# Patient Record
Sex: FEMALE | Race: BLACK OR AFRICAN AMERICAN | NOT HISPANIC OR LATINO | Employment: FULL TIME | URBAN - METROPOLITAN AREA
[De-identification: names, ages, dates, MRNs, and addresses within clinical notes are randomized per-mention and may not be internally consistent; named-entity substitution may affect disease eponyms.]

---

## 2023-03-08 DIAGNOSIS — E78.5 HYPERLIPIDEMIA, UNSPECIFIED HYPERLIPIDEMIA TYPE: Primary | ICD-10-CM

## 2023-03-09 RX ORDER — ROSUVASTATIN CALCIUM 20 MG/1
TABLET, COATED ORAL
Qty: 90 TABLET | Refills: 1 | Status: SHIPPED | OUTPATIENT
Start: 2023-03-09 | End: 2023-07-18 | Stop reason: ALTCHOICE

## 2023-07-18 ENCOUNTER — OFFICE VISIT (OUTPATIENT)
Dept: PRIMARY CARE | Facility: CLINIC | Age: 63
End: 2023-07-18
Payer: COMMERCIAL

## 2023-07-18 ENCOUNTER — LAB (OUTPATIENT)
Dept: LAB | Facility: LAB | Age: 63
End: 2023-07-18
Payer: COMMERCIAL

## 2023-07-18 VITALS
OXYGEN SATURATION: 98 % | HEIGHT: 66 IN | WEIGHT: 230.3 LBS | HEART RATE: 72 BPM | SYSTOLIC BLOOD PRESSURE: 112 MMHG | RESPIRATION RATE: 16 BRPM | TEMPERATURE: 98 F | DIASTOLIC BLOOD PRESSURE: 62 MMHG | BODY MASS INDEX: 37.01 KG/M2

## 2023-07-18 DIAGNOSIS — I10 ESSENTIAL (PRIMARY) HYPERTENSION: ICD-10-CM

## 2023-07-18 DIAGNOSIS — Z12.31 ENCOUNTER FOR SCREENING MAMMOGRAM FOR BREAST CANCER: ICD-10-CM

## 2023-07-18 DIAGNOSIS — R53.83 FATIGUE, UNSPECIFIED TYPE: ICD-10-CM

## 2023-07-18 DIAGNOSIS — E78.5 HYPERLIPIDEMIA, UNSPECIFIED HYPERLIPIDEMIA TYPE: ICD-10-CM

## 2023-07-18 DIAGNOSIS — E55.9 VITAMIN D DEFICIENCY: ICD-10-CM

## 2023-07-18 DIAGNOSIS — I10 ESSENTIAL HYPERTENSION: ICD-10-CM

## 2023-07-18 DIAGNOSIS — E66.9 DIABETES MELLITUS TYPE 2 IN OBESE: Primary | ICD-10-CM

## 2023-07-18 DIAGNOSIS — E11.69 DIABETES MELLITUS TYPE 2 IN OBESE: Primary | ICD-10-CM

## 2023-07-18 DIAGNOSIS — E11.9 TYPE 2 DIABETES MELLITUS WITHOUT COMPLICATION, UNSPECIFIED WHETHER LONG TERM INSULIN USE (MULTI): ICD-10-CM

## 2023-07-18 DIAGNOSIS — Z12.11 ENCOUNTER FOR SCREENING FOR MALIGNANT NEOPLASM OF COLON: ICD-10-CM

## 2023-07-18 DIAGNOSIS — Z78.0 ASYMPTOMATIC MENOPAUSE: ICD-10-CM

## 2023-07-18 PROBLEM — Z96.651 CHRONIC KNEE PAIN AFTER TOTAL REPLACEMENT OF RIGHT KNEE JOINT: Status: ACTIVE | Noted: 2023-07-18

## 2023-07-18 PROBLEM — M79.671 RIGHT FOOT PAIN: Status: ACTIVE | Noted: 2023-07-18

## 2023-07-18 PROBLEM — M25.812 SHOULDER IMPINGEMENT, LEFT: Status: ACTIVE | Noted: 2023-07-18

## 2023-07-18 PROBLEM — M25.571 SINUS TARSI SYNDROME OF RIGHT FOOT: Status: ACTIVE | Noted: 2023-07-18

## 2023-07-18 PROBLEM — F17.200 TOBACCO USE DISORDER: Status: ACTIVE | Noted: 2023-07-18

## 2023-07-18 PROBLEM — R05.8 COUGH WITH EXPOSURE TO COVID-19 VIRUS: Status: ACTIVE | Noted: 2023-07-18

## 2023-07-18 PROBLEM — M25.511 RIGHT SHOULDER PAIN: Status: ACTIVE | Noted: 2023-07-18

## 2023-07-18 PROBLEM — N95.2 POSTMENOPAUSAL ATROPHIC VAGINITIS: Status: ACTIVE | Noted: 2023-07-18

## 2023-07-18 PROBLEM — M25.561 RIGHT KNEE PAIN: Status: ACTIVE | Noted: 2023-07-18

## 2023-07-18 PROBLEM — M25.561 CHRONIC KNEE PAIN AFTER TOTAL REPLACEMENT OF RIGHT KNEE JOINT: Status: ACTIVE | Noted: 2023-07-18

## 2023-07-18 PROBLEM — M25.562 LEFT KNEE PAIN: Status: ACTIVE | Noted: 2023-07-18

## 2023-07-18 PROBLEM — M19.041 ARTHRITIS OF HAND, RIGHT, DEGENERATIVE: Status: ACTIVE | Noted: 2023-07-18

## 2023-07-18 PROBLEM — M89.9 SCAPULAR DYSFUNCTION: Status: ACTIVE | Noted: 2023-07-18

## 2023-07-18 PROBLEM — G57.91 NEURITIS OF RIGHT FOOT: Status: ACTIVE | Noted: 2023-07-18

## 2023-07-18 PROBLEM — M25.811 SHOULDER IMPINGEMENT, RIGHT: Status: ACTIVE | Noted: 2023-07-18

## 2023-07-18 PROBLEM — M54.50 LOW BACK PAIN: Status: ACTIVE | Noted: 2023-07-18

## 2023-07-18 PROBLEM — F17.200 TOBACCO USE DISORDER: Status: RESOLVED | Noted: 2023-07-18 | Resolved: 2023-07-18

## 2023-07-18 PROBLEM — I70.0 ATHEROSCLEROSIS OF AORTA (CMS-HCC): Status: ACTIVE | Noted: 2023-07-18

## 2023-07-18 PROBLEM — G56.22 CUBITAL TUNNEL SYNDROME ON LEFT: Status: ACTIVE | Noted: 2023-07-18

## 2023-07-18 PROBLEM — R10.9 FLANK PAIN: Status: ACTIVE | Noted: 2023-07-18

## 2023-07-18 PROBLEM — G89.29 CHRONIC KNEE PAIN AFTER TOTAL REPLACEMENT OF RIGHT KNEE JOINT: Status: ACTIVE | Noted: 2023-07-18

## 2023-07-18 PROBLEM — M17.12 OSTEOARTHRITIS OF LEFT KNEE: Status: ACTIVE | Noted: 2023-07-18

## 2023-07-18 PROBLEM — Z86.59 HISTORY OF CLAUSTROPHOBIA: Status: ACTIVE | Noted: 2023-07-18

## 2023-07-18 PROBLEM — E04.9 GOITER: Status: ACTIVE | Noted: 2023-07-18

## 2023-07-18 PROBLEM — M19.071 ARTHRITIS OF FOOT, RIGHT: Status: ACTIVE | Noted: 2023-07-18

## 2023-07-18 PROBLEM — E87.6 HYPOKALEMIA: Status: ACTIVE | Noted: 2023-07-18

## 2023-07-18 PROBLEM — M70.62 TROCHANTERIC BURSITIS OF LEFT HIP: Status: ACTIVE | Noted: 2023-07-18

## 2023-07-18 PROBLEM — Z96.659 HISTORY OF TOTAL KNEE REPLACEMENT: Status: ACTIVE | Noted: 2023-07-18

## 2023-07-18 PROBLEM — M77.31 CALCANEAL SPUR OF RIGHT FOOT: Status: ACTIVE | Noted: 2023-07-18

## 2023-07-18 PROBLEM — Z20.822 COUGH WITH EXPOSURE TO COVID-19 VIRUS: Status: ACTIVE | Noted: 2023-07-18

## 2023-07-18 PROBLEM — M54.2 CERVICAL PAIN: Status: ACTIVE | Noted: 2023-07-18

## 2023-07-18 PROBLEM — E05.90 HYPERTHYROIDISM, SUBCLINICAL: Status: ACTIVE | Noted: 2023-07-18

## 2023-07-18 PROBLEM — G47.33 OSA (OBSTRUCTIVE SLEEP APNEA): Status: ACTIVE | Noted: 2023-07-18

## 2023-07-18 PROBLEM — M54.12 CERVICAL RADICULOPATHY: Status: ACTIVE | Noted: 2023-07-18

## 2023-07-18 PROBLEM — M79.641 PAIN OF RIGHT HAND: Status: ACTIVE | Noted: 2023-07-18

## 2023-07-18 PROBLEM — J30.9 ALLERGIC RHINITIS: Status: ACTIVE | Noted: 2023-07-18

## 2023-07-18 PROBLEM — M72.2 PLANTAR FASCIITIS OF LEFT FOOT: Status: ACTIVE | Noted: 2023-07-18

## 2023-07-18 PROBLEM — R07.89 ATYPICAL CHEST PAIN: Status: ACTIVE | Noted: 2023-07-18

## 2023-07-18 PROBLEM — M24.662 ARTHROFIBROSIS OF KNEE JOINT, LEFT: Status: ACTIVE | Noted: 2023-07-18

## 2023-07-18 PROBLEM — M19.011 ARTHRITIS OF RIGHT SHOULDER REGION: Status: ACTIVE | Noted: 2023-07-18

## 2023-07-18 PROBLEM — R09.81 CONGESTED NOSE: Status: ACTIVE | Noted: 2023-07-18

## 2023-07-18 PROBLEM — S46.812A STRAIN OF LEFT TRAPEZIUS MUSCLE: Status: ACTIVE | Noted: 2023-07-18

## 2023-07-18 PROBLEM — G25.89 SCAPULAR DYSKINESIS: Status: ACTIVE | Noted: 2023-07-18

## 2023-07-18 LAB
CALCIDIOL (25 OH VITAMIN D3) (NG/ML) IN SER/PLAS: 56 NG/ML
CHOLESTEROL (MG/DL) IN SER/PLAS: 102 MG/DL (ref 0–199)
CHOLESTEROL IN HDL (MG/DL) IN SER/PLAS: 50.2 MG/DL
CHOLESTEROL/HDL RATIO: 2
COBALAMIN (VITAMIN B12) (PG/ML) IN SER/PLAS: 588 PG/ML (ref 211–911)
ESTIMATED AVERAGE GLUCOSE FOR HBA1C: 140 MG/DL
HEMOGLOBIN A1C/HEMOGLOBIN TOTAL IN BLOOD: 6.5 %
IRON (UG/DL) IN SER/PLAS: 88 UG/DL (ref 35–150)
IRON BINDING CAPACITY (UG/DL) IN SER/PLAS: 410 UG/DL (ref 240–445)
IRON SATURATION (%) IN SER/PLAS: 21 % (ref 25–45)
LDL: 39 MG/DL (ref 0–99)
POTASSIUM (MMOL/L) IN SER/PLAS: 3.1 MMOL/L (ref 3.5–5.3)
THYROTROPIN (MIU/L) IN SER/PLAS BY DETECTION LIMIT <= 0.05 MIU/L: 0.4 MIU/L (ref 0.44–3.98)
THYROXINE (T4) FREE (NG/DL) IN SER/PLAS: 0.93 NG/DL (ref 0.78–1.48)
TRIGLYCERIDE (MG/DL) IN SER/PLAS: 63 MG/DL (ref 0–149)
VLDL: 13 MG/DL (ref 0–40)

## 2023-07-18 PROCEDURE — 99213 OFFICE O/P EST LOW 20 MIN: CPT | Performed by: FAMILY MEDICINE

## 2023-07-18 PROCEDURE — 83550 IRON BINDING TEST: CPT

## 2023-07-18 PROCEDURE — 82607 VITAMIN B-12: CPT

## 2023-07-18 PROCEDURE — 36415 COLL VENOUS BLD VENIPUNCTURE: CPT

## 2023-07-18 PROCEDURE — 84443 ASSAY THYROID STIM HORMONE: CPT

## 2023-07-18 PROCEDURE — 82306 VITAMIN D 25 HYDROXY: CPT

## 2023-07-18 PROCEDURE — 1036F TOBACCO NON-USER: CPT | Performed by: FAMILY MEDICINE

## 2023-07-18 PROCEDURE — 3078F DIAST BP <80 MM HG: CPT | Performed by: FAMILY MEDICINE

## 2023-07-18 PROCEDURE — 80061 LIPID PANEL: CPT

## 2023-07-18 PROCEDURE — 84132 ASSAY OF SERUM POTASSIUM: CPT

## 2023-07-18 PROCEDURE — 84439 ASSAY OF FREE THYROXINE: CPT

## 2023-07-18 PROCEDURE — 4010F ACE/ARB THERAPY RXD/TAKEN: CPT | Performed by: FAMILY MEDICINE

## 2023-07-18 PROCEDURE — 3074F SYST BP LT 130 MM HG: CPT | Performed by: FAMILY MEDICINE

## 2023-07-18 PROCEDURE — 83036 HEMOGLOBIN GLYCOSYLATED A1C: CPT

## 2023-07-18 PROCEDURE — 83540 ASSAY OF IRON: CPT

## 2023-07-18 RX ORDER — DULAGLUTIDE 4.5 MG/.5ML
INJECTION, SOLUTION SUBCUTANEOUS
Qty: 2 ML | Refills: 3 | Status: SHIPPED | OUTPATIENT
Start: 2023-07-18 | End: 2023-11-02

## 2023-07-18 RX ORDER — METFORMIN HYDROCHLORIDE 500 MG/1
TABLET ORAL
COMMUNITY
End: 2024-03-02

## 2023-07-18 RX ORDER — BLOOD SUGAR DIAGNOSTIC
STRIP MISCELLANEOUS
COMMUNITY
Start: 2018-10-03

## 2023-07-18 RX ORDER — LISINOPRIL 2.5 MG/1
1 TABLET ORAL EVERY EVENING
COMMUNITY
Start: 2015-12-15

## 2023-07-18 RX ORDER — CHOLECALCIFEROL (VITAMIN D3) 50 MCG
1 TABLET ORAL DAILY
COMMUNITY

## 2023-07-18 RX ORDER — PANTOPRAZOLE SODIUM 20 MG/1
20 TABLET, DELAYED RELEASE ORAL
COMMUNITY
Start: 2012-04-13 | End: 2024-03-19 | Stop reason: WASHOUT

## 2023-07-18 RX ORDER — DULAGLUTIDE 4.5 MG/.5ML
INJECTION, SOLUTION SUBCUTANEOUS
COMMUNITY
Start: 2023-05-16 | End: 2023-07-18 | Stop reason: SDUPTHER

## 2023-07-18 RX ORDER — LANCETS
EACH MISCELLANEOUS
COMMUNITY
Start: 2018-10-03

## 2023-07-18 RX ORDER — FLUTICASONE PROPIONATE 50 MCG
2 SPRAY, SUSPENSION (ML) NASAL DAILY
COMMUNITY
Start: 2022-12-05 | End: 2023-10-23

## 2023-07-18 RX ORDER — ACETAMINOPHEN 325 MG/1
TABLET ORAL EVERY 4 HOURS PRN
COMMUNITY
Start: 2012-04-12

## 2023-07-18 RX ORDER — GABAPENTIN 100 MG/1
100 CAPSULE ORAL 3 TIMES DAILY
COMMUNITY
Start: 2012-04-12 | End: 2024-03-19 | Stop reason: WASHOUT

## 2023-07-18 RX ORDER — CHLORTHALIDONE 25 MG/1
25 TABLET ORAL DAILY
COMMUNITY
End: 2023-11-02

## 2023-07-18 ASSESSMENT — PATIENT HEALTH QUESTIONNAIRE - PHQ9
SUM OF ALL RESPONSES TO PHQ9 QUESTIONS 1 AND 2: 0
2. FEELING DOWN, DEPRESSED OR HOPELESS: NOT AT ALL
1. LITTLE INTEREST OR PLEASURE IN DOING THINGS: NOT AT ALL

## 2023-07-18 ASSESSMENT — LIFESTYLE VARIABLES: HOW MANY STANDARD DRINKS CONTAINING ALCOHOL DO YOU HAVE ON A TYPICAL DAY: PATIENT DOES NOT DRINK

## 2023-07-18 NOTE — PROGRESS NOTES
"Subjective   Patient ID: Marina Menezes is a 63 y.o. female who presents for Follow-up (Pt is here for DM fuv.).    HPI   Health Maintenance:  - Colonoscopy: 10/17- due at 64- ordered for future  - Mammogram: 2/17/22- ordered for future  - PAP: Hyster  - Bone density DEXA: Due at 64  Immunizations:  - COVID vaccination status: Completed 2/4  - Others:Shingix/ tdap/hep B  The new COVID bivalent booster is out now - you can check with your local pharm for availability!      Dm  Using trulicity  Not much wt loss due to stopping the exercise and has been poor exercise    Fatigue  Daytime sleepiness and fatigue throughout the day      Review of Systems  All systems reviewed and neg if not noted in the HPI above     Objective   /62 (Patient Position: Sitting)   Pulse 72   Temp 36.7 °C (98 °F)   Resp 16   Ht 1.676 m (5' 6\")   Wt 104 kg (230 lb 4.8 oz)   SpO2 98%   BMI 37.17 kg/m²     Physical Exam  Pleasant  Eyes: conjunctiva non-icteric and eye lids are without obvious rash or drooping. Pupils are symmetric.   Ears, Nose, Mouth, and Throat: External ears and nose appear to be without deformity or rash. No lesions or masses noted. Hearing is grossly intact.   CV: RRR, no murmur  Carotids: no bruits  Pulm:CTA B/L  Abd: soft, NTTP, + BS  LE: no edema  Psychiatric: Alert, orientation to person, place, and time. Recent/remote memory as evidenced through face-to-face interaction and discussion appear grossly intact. Mood and affect are normal.      Assessment/Plan   Problem List Items Addressed This Visit       Diabetes mellitus type 2 in obese (CMS/HCC) - Primary    Relevant Medications    Trulicity 4.5 mg/0.5 mL pen injector    Essential hypertension    Hyperlipidemia     Other Visit Diagnoses       Asymptomatic menopause        Fatigue, unspecified type              Checking hba1c- still pending   Last hba1c 6.5       Please follow up in  3months for WELLNESS (faitgue/ DM/wt loss/ htn) or as needed.       "

## 2023-07-18 NOTE — LETTER
July 18, 2023     Patient: Marina Menezes   YOB: 1960   Date of Visit: 7/18/2023       To Whom It May Concern:    Marina Menzees was seen in my clinic on 7/18/2023 at 11:00 am. Please excuse Marina for her absence from work on this day to make the appointment.    If you have any questions or concerns, please don't hesitate to call.         Sincerely,         Sia Menezes, DO        CC: No Recipients

## 2023-07-18 NOTE — PATIENT INSTRUCTIONS
Fatigue  - labs  - daytime sleepiness- using cpap- to call sleep medicine for follow  - healthy diet  - exercise    HTN  - Your blood pressure is at goal today- goal is less than 130/80  - Continue your current medications  - Weight loss can help lower your bp!  Work on a healthy whole food diet and add at least 30min of exercise 5 days per week  - Work on a low salt diet     DM  - Labs pending  - continue meds      Please follow up in  3months for WELLNESS (faitgue/ DM/wt loss/ htn) or as needed.     ** If labs or imaging ordered at today's visit, all the non-urgent results will be discussed at your next visit    If you have been referred for a special test or to a specialist please call  [phone] to schedule an appointment.  If you have any further questions, or if develop new or worsened symptoms, please give our office a call at [phone].

## 2023-09-14 ENCOUNTER — HOSPITAL ENCOUNTER (OUTPATIENT)
Dept: DATA CONVERSION | Facility: HOSPITAL | Age: 63
End: 2023-09-14
Attending: STUDENT IN AN ORGANIZED HEALTH CARE EDUCATION/TRAINING PROGRAM | Admitting: STUDENT IN AN ORGANIZED HEALTH CARE EDUCATION/TRAINING PROGRAM
Payer: COMMERCIAL

## 2023-09-14 DIAGNOSIS — Z12.11 ENCOUNTER FOR SCREENING FOR MALIGNANT NEOPLASM OF COLON: ICD-10-CM

## 2023-09-14 DIAGNOSIS — D12.5 BENIGN NEOPLASM OF SIGMOID COLON: ICD-10-CM

## 2023-09-14 DIAGNOSIS — K64.4 RESIDUAL HEMORRHOIDAL SKIN TAGS: ICD-10-CM

## 2023-09-14 DIAGNOSIS — K57.30 DIVERTICULOSIS OF LARGE INTESTINE WITHOUT PERFORATION OR ABSCESS WITHOUT BLEEDING: ICD-10-CM

## 2023-09-14 LAB — POCT GLUCOSE: 109 MG/DL (ref 74–99)

## 2023-09-15 DIAGNOSIS — R92.8 ABNORMAL MAMMOGRAM: Primary | ICD-10-CM

## 2023-09-22 LAB
COMPLETE PATHOLOGY REPORT: NORMAL
CONVERTED CLINICAL DIAGNOSIS-HISTORY: NORMAL
CONVERTED FINAL DIAGNOSIS: NORMAL
CONVERTED FINAL REPORT PDF LINK TO COPY AND PASTE: NORMAL
CONVERTED GROSS DESCRIPTION: NORMAL

## 2023-09-26 ENCOUNTER — APPOINTMENT (OUTPATIENT)
Dept: PRIMARY CARE | Facility: CLINIC | Age: 63
End: 2023-09-26
Payer: COMMERCIAL

## 2023-10-05 ENCOUNTER — APPOINTMENT (OUTPATIENT)
Dept: PRIMARY CARE | Facility: CLINIC | Age: 63
End: 2023-10-05
Payer: COMMERCIAL

## 2023-10-06 ENCOUNTER — ANCILLARY PROCEDURE (OUTPATIENT)
Dept: RADIOLOGY | Facility: CLINIC | Age: 63
End: 2023-10-06
Payer: COMMERCIAL

## 2023-10-06 DIAGNOSIS — R92.8 ABNORMAL MAMMOGRAM: ICD-10-CM

## 2023-10-06 PROCEDURE — 76642 ULTRASOUND BREAST LIMITED: CPT | Mod: LEFT SIDE | Performed by: STUDENT IN AN ORGANIZED HEALTH CARE EDUCATION/TRAINING PROGRAM

## 2023-10-06 PROCEDURE — 76642 ULTRASOUND BREAST LIMITED: CPT | Mod: LT

## 2023-10-18 ENCOUNTER — ANCILLARY PROCEDURE (OUTPATIENT)
Dept: RADIOLOGY | Facility: CLINIC | Age: 63
End: 2023-10-18
Payer: COMMERCIAL

## 2023-10-18 DIAGNOSIS — Z78.0 ASYMPTOMATIC MENOPAUSAL STATE: ICD-10-CM

## 2023-10-18 PROCEDURE — 77080 DXA BONE DENSITY AXIAL: CPT

## 2023-10-18 PROCEDURE — 77080 DXA BONE DENSITY AXIAL: CPT | Performed by: RADIOLOGY

## 2023-10-23 ENCOUNTER — HOSPITAL ENCOUNTER (EMERGENCY)
Facility: HOSPITAL | Age: 63
Discharge: HOME | End: 2023-10-23
Payer: COMMERCIAL

## 2023-10-23 VITALS
RESPIRATION RATE: 16 BRPM | OXYGEN SATURATION: 100 % | DIASTOLIC BLOOD PRESSURE: 80 MMHG | HEIGHT: 66 IN | BODY MASS INDEX: 36.96 KG/M2 | TEMPERATURE: 98.1 F | WEIGHT: 230 LBS | SYSTOLIC BLOOD PRESSURE: 118 MMHG | HEART RATE: 68 BPM

## 2023-10-23 DIAGNOSIS — R09.81 NASAL CONGESTION: Primary | ICD-10-CM

## 2023-10-23 LAB
FLUAV RNA RESP QL NAA+PROBE: NOT DETECTED
FLUBV RNA RESP QL NAA+PROBE: NOT DETECTED
SARS-COV-2 RNA RESP QL NAA+PROBE: NOT DETECTED

## 2023-10-23 PROCEDURE — 2500000002 HC RX 250 W HCPCS SELF ADMINISTERED DRUGS (ALT 637 FOR MEDICARE OP, ALT 636 FOR OP/ED)

## 2023-10-23 PROCEDURE — 87636 SARSCOV2 & INF A&B AMP PRB: CPT

## 2023-10-23 PROCEDURE — 2500000004 HC RX 250 GENERAL PHARMACY W/ HCPCS (ALT 636 FOR OP/ED)

## 2023-10-23 PROCEDURE — 99283 EMERGENCY DEPT VISIT LOW MDM: CPT

## 2023-10-23 RX ORDER — FLUTICASONE PROPIONATE 50 MCG
2 SPRAY, SUSPENSION (ML) NASAL DAILY
Status: DISCONTINUED | OUTPATIENT
Start: 2023-10-23 | End: 2023-10-23 | Stop reason: HOSPADM

## 2023-10-23 RX ORDER — OLOPATADINE HYDROCHLORIDE 1 MG/ML
1 SOLUTION/ DROPS OPHTHALMIC 2 TIMES DAILY
Qty: 0.7 ML | Refills: 0 | Status: SHIPPED | OUTPATIENT
Start: 2023-10-23 | End: 2024-03-19 | Stop reason: WASHOUT

## 2023-10-23 RX ORDER — LORATADINE 10 MG/1
10 TABLET ORAL DAILY
Status: DISCONTINUED | OUTPATIENT
Start: 2023-10-23 | End: 2023-10-23 | Stop reason: HOSPADM

## 2023-10-23 RX ORDER — FLUTICASONE PROPIONATE 50 MCG
2 SPRAY, SUSPENSION (ML) NASAL DAILY
Qty: 16 G | Refills: 0 | Status: SHIPPED | OUTPATIENT
Start: 2023-10-23 | End: 2024-03-19

## 2023-10-23 RX ORDER — CETIRIZINE HYDROCHLORIDE 10 MG/1
10 TABLET ORAL DAILY
Qty: 7 TABLET | Refills: 0 | Status: SHIPPED | OUTPATIENT
Start: 2023-10-23 | End: 2024-03-19 | Stop reason: WASHOUT

## 2023-10-23 RX ADMIN — FLUTICASONE PROPIONATE 2 SPRAY: 50 SPRAY, METERED NASAL at 12:21

## 2023-10-23 RX ADMIN — LORATADINE 10 MG: 10 TABLET ORAL at 10:29

## 2023-10-23 ASSESSMENT — COLUMBIA-SUICIDE SEVERITY RATING SCALE - C-SSRS
6. HAVE YOU EVER DONE ANYTHING, STARTED TO DO ANYTHING, OR PREPARED TO DO ANYTHING TO END YOUR LIFE?: NO
2. HAVE YOU ACTUALLY HAD ANY THOUGHTS OF KILLING YOURSELF?: NO
1. IN THE PAST MONTH, HAVE YOU WISHED YOU WERE DEAD OR WISHED YOU COULD GO TO SLEEP AND NOT WAKE UP?: NO

## 2023-10-23 ASSESSMENT — PAIN - FUNCTIONAL ASSESSMENT: PAIN_FUNCTIONAL_ASSESSMENT: 0-10

## 2023-10-23 ASSESSMENT — PAIN SCALES - GENERAL: PAINLEVEL_OUTOF10: 9

## 2023-10-23 ASSESSMENT — PAIN DESCRIPTION - LOCATION: LOCATION: NOSE

## 2023-10-23 ASSESSMENT — PAIN DESCRIPTION - PAIN TYPE: TYPE: ACUTE PAIN

## 2023-10-23 NOTE — Clinical Note
Marina Menezes was seen and treated in our emergency department on 10/23/2023.  She may return to work on 10/25/2023.       If you have any questions or concerns, please don't hesitate to call.      Mio Jung PA-C

## 2023-10-23 NOTE — ED PROVIDER NOTES
HPI   Chief Complaint   Patient presents with    Nasal Congestion       63-year-old female with past medical history of HTN, HLD, DM presents the ED today with a chief concern of nasal congestion.  Patient states symptoms started about 3 days ago.  States that for the past 3 days she has had nasal congestion, sneezing, rhinorrhea, and itchy/watery eyes and ears.  She denies any itchy or sore throat.  She states that her ears have been actually itching for the past week or so however the nasal congestion, rhinorrhea, sneezing, and itchy/watery eyes started 3 days ago.  She states that she has a history of allergies and feels that her symptoms are similar.  She denies any fever/chills, nausea/vomiting, shortness of breath, chest pain, rashes.  Denies any known trauma or injury to the area.  She states that she notices the nasal congestion most at night when she is about to fall asleep.  She is up-to-date on all her immunizations.  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  She states that she is not can study in the school and does a lot of cleaning.  Denies any new detergents use.  She states that she does sleep with one of her windows open.  She has no other symptoms or concerns at this time.  She denies any heart problems.  Patient has not done anything at home for symptoms.  She denies any sore throat.      History provided by:  Patient   used: No                        No data recorded                Patient History   Past Medical History:   Diagnosis Date    Conduction disorder, unspecified 06/27/2019    Skipped heart beats    COVID-19 04/28/2020    COVID-19 virus infection    Cramp and spasm 06/21/2019    Limb cramp    Cramp and spasm 07/27/2016    Limb cramps    Disorder of pigmentation, unspecified 07/27/2016    Discoloration of skin of toe    Dorsalgia, unspecified 09/20/2017    Musculoskeletal back pain    Effusion, left knee 10/24/2018    Effusion of left  knee    Encounter for other general examination 10/22/2019    Encounter for biometric screening    Encounter for other screening for malignant neoplasm of breast 04/28/2021    Screening for breast cancer    Essential (primary) hypertension 11/14/2022    Hypertension    Immunization not carried out because of patient refusal 10/28/2018    Influenza vaccination declined by patient    Morbid (severe) obesity due to excess calories (CMS/HCC) 01/07/2022    Class 2 severe obesity with serious comorbidity and body mass index (BMI) of 37.0 to 37.9 in adult    Morbid (severe) obesity due to excess calories (CMS/HCC) 12/07/2020    Class 2 severe obesity with serious comorbidity and body mass index (BMI) of 37.0 to 37.9 in adult    Myalgia, other site 04/28/2020    Musculoskeletal pain    Obstructive sleep apnea (adult) (pediatric)     Obstructive sleep apnea, adult    Other chest pain 08/25/2022    Atypical chest pain    Other fecal abnormalities 05/21/2019    Loose stools    Personal history of diseases of the blood and blood-forming organs and certain disorders involving the immune mechanism     History of anemia    Personal history of other diseases of the circulatory system     History of cardiac arrhythmia    Personal history of other diseases of the circulatory system 10/25/2019    History of hypertension    Personal history of other diseases of the musculoskeletal system and connective tissue     History of gout    Personal history of other diseases of the respiratory system 12/20/2019    History of acute bronchitis    Personal history of other endocrine, nutritional and metabolic disease     History of hyperlipidemia    Personal history of other endocrine, nutritional and metabolic disease     History of obesity    Personal history of other endocrine, nutritional and metabolic disease 06/20/2016    History of hypokalemia    Personal history of other endocrine, nutritional and metabolic disease 04/03/2018    History of  hypokalemia    Personal history of other specified conditions     History of vertigo    Personal history of other specified conditions 06/08/2020    History of dizziness    Personal history of other specified conditions 03/05/2019    History of diarrhea    Personal history of other specified conditions 06/30/2020    History of abdominal pain    Personal history of other specified conditions 07/29/2019    History of palpitations    Personal history of other specified conditions 06/20/2016    History of nausea    Personal history of urinary (tract) infections 09/20/2017    History of urinary tract infection    Pleurodynia 12/20/2019    Rib pain on left side    Type 2 diabetes mellitus without complications (CMS/HCC) 10/25/2019    Diabetes mellitus    Unspecified symptoms and signs involving the genitourinary system     UTI symptoms     Past Surgical History:   Procedure Laterality Date    CT ANGIO HEART CORONARY  3/16/2022    CT HEART CORONARY ANGIOGRAM 3/16/2022 [facility] [place] EMERGENCY LEGACY    HYSTERECTOMY  08/23/2021    Hysterectomy    OTHER SURGICAL HISTORY  10/25/2019    Knee replacement    OTHER SURGICAL HISTORY  10/25/2019    Foot surgery     No family history on file.  Social History     Tobacco Use    Smoking status: Never    Smokeless tobacco: Never   Substance Use Topics    Alcohol use: Never    Drug use: Never       Physical Exam   ED Triage Vitals [10/23/23 1004]   Temp Heart Rate Resp BP   36.7 °C (98.1 °F) 71 18 (!) 126/95      SpO2 Temp Source Heart Rate Source Patient Position   95 % Temporal -- Sitting      BP Location FiO2 (%)     Left arm --       Physical Exam  Gen.: Vitals noted no distress afebrile. Normal phonation, no stridor, no trismus. Patient is handling secretions well without any tripod positioning or drooling.  Patient sounds congested.  ENT: TMs clear bilaterally, EACs unremarkable. Mastoids nontender. Posterior oropharynx without erythema, exudate, or swelling. Uvula is in the midline and  nonedematous. No Jorden's Angina.  No epistaxis or nasal foreign bodies.  No tumors or polyps noted in the nasal cavity.  Bilateral turbinate hypertrophy throughout the nasal cavity.  No septal hematoma.  Neck: Supple. No meningismus through full range of motion. No lymphadenopathy.   Cardiac: Regular rate rhythm no murmur.   Lungs: Good aeration throughout. No adventitious breath sounds.   Abdomen: Soft nontender nonsurgical throughout  Extremities: No peripheral edema. extremities are moist with good skin turgor.  Skin: No rash.   Neuro: No focal neurologic deficits. cranial nerves II-XII grossly intact.       ED Course & MDM   ED Course as of 10/23/23 1248   Mon Oct 23, 2023   1149 Influenza and COVID-negative [MC]      ED Course User Index  [MC] Mio Jung PA-C         Diagnoses as of 10/23/23 1248   Nasal congestion       Medical Decision Making  63-year-old female with past medical history of HTN, HLD, DM presents the ED today with a chief concern of nasal congestion.  Vital signs are reassuring.  Patient overall appears well and is nontoxic-appearing.  Center criteria 0.  She satting well on room air.  Other than her history of diabetes, she is not immunocompromised.  She does not have any systemic signs or symptoms at this time.  No nasal foreign body, tumor, or polyp noted.  No septal hematoma noted.  No signs and symptoms of any infection at this time.  COVID and influenza negative.  She does not have any sore throat.  No concern for any strep pharyngitis at this time.  Again posterior oropharynx exam unremarkable.  She has full range of motion of the neck without any meningismus.  No concern for any meningitis at this time.  No cough.  No concern for any pneumonia at this time.  Vital signs unremarkable.  Given her itchy/watery eyes, ear itching, and sneezing/rhinorrhea/nasal congestion, will treat for allergies at this time.  She was given Claritin and Flonase in the ED.  She was sent cetirizine,  Flonase, and olopatadine ophthalmic eyedrops to her pharmacy.  Discussed strict return precautions.  Discussed my pression and findings with patient and she feels comfortable returning home.  We discussed very strict return precautions including returning for any new or worsening signs or symptoms.  Patient is in agreement this plan.  She will follow-up with her PCP within 3 days.    Differential diagnosis: Allergies, COVID, influenza, strep pharyngitis, viral pharyngitis, PTA, retropharyngeal abscess, diphtheria, septal hematoma, nasal foreign body, polyp, tumor    Disposition/treatment  1.  Nasal congestion    Shared decision-making was used patient feels comfortable returning home        Procedure  Procedures     Mio Jung PA-C  10/23/23 5099

## 2023-10-23 NOTE — DISCHARGE INSTRUCTIONS
Please return to the emergency department immediately if you have any new or worsening signs or symptoms

## 2023-11-02 DIAGNOSIS — I10 ESSENTIAL (PRIMARY) HYPERTENSION: ICD-10-CM

## 2023-11-02 DIAGNOSIS — E11.69 DIABETES MELLITUS TYPE 2 IN OBESE: ICD-10-CM

## 2023-11-02 DIAGNOSIS — E66.9 DIABETES MELLITUS TYPE 2 IN OBESE: ICD-10-CM

## 2023-11-02 RX ORDER — CHLORTHALIDONE 25 MG/1
25 TABLET ORAL DAILY
Qty: 90 TABLET | Refills: 3 | Status: SHIPPED | OUTPATIENT
Start: 2023-11-02

## 2023-11-02 RX ORDER — POTASSIUM CHLORIDE 20 MEQ/1
20 TABLET, EXTENDED RELEASE ORAL DAILY
Qty: 90 TABLET | Refills: 1 | Status: SHIPPED | OUTPATIENT
Start: 2023-11-02

## 2023-11-02 RX ORDER — DULAGLUTIDE 4.5 MG/.5ML
INJECTION, SOLUTION SUBCUTANEOUS
Qty: 3 ML | Refills: 1 | Status: SHIPPED | OUTPATIENT
Start: 2023-11-02 | End: 2024-05-07

## 2023-11-02 RX ORDER — ROSUVASTATIN CALCIUM 20 MG/1
20 TABLET, COATED ORAL DAILY
COMMUNITY
End: 2023-11-07 | Stop reason: SDUPTHER

## 2023-11-02 RX ORDER — DULAGLUTIDE 3 MG/.5ML
INJECTION, SOLUTION SUBCUTANEOUS
Refills: 1 | OUTPATIENT
Start: 2023-11-02

## 2023-11-07 DIAGNOSIS — E66.9 DIABETES MELLITUS TYPE 2 IN OBESE: Primary | ICD-10-CM

## 2023-11-07 DIAGNOSIS — E11.69 DIABETES MELLITUS TYPE 2 IN OBESE: ICD-10-CM

## 2023-11-07 DIAGNOSIS — E66.9 DIABETES MELLITUS TYPE 2 IN OBESE: ICD-10-CM

## 2023-11-07 DIAGNOSIS — E11.69 DIABETES MELLITUS TYPE 2 IN OBESE: Primary | ICD-10-CM

## 2023-11-09 RX ORDER — ROSUVASTATIN CALCIUM 20 MG/1
20 TABLET, COATED ORAL DAILY
Qty: 90 TABLET | Refills: 3 | Status: SHIPPED | OUTPATIENT
Start: 2023-11-09

## 2023-11-09 RX ORDER — DULAGLUTIDE 3 MG/.5ML
INJECTION, SOLUTION SUBCUTANEOUS
Refills: 1 | OUTPATIENT
Start: 2023-11-09

## 2023-11-09 RX ORDER — DULAGLUTIDE 4.5 MG/.5ML
INJECTION, SOLUTION SUBCUTANEOUS
Refills: 1 | OUTPATIENT
Start: 2023-11-09

## 2023-11-21 ENCOUNTER — APPOINTMENT (OUTPATIENT)
Dept: PRIMARY CARE | Facility: CLINIC | Age: 63
End: 2023-11-21
Payer: COMMERCIAL

## 2024-01-21 ENCOUNTER — CLINICAL SUPPORT (OUTPATIENT)
Dept: EMERGENCY MEDICINE | Facility: HOSPITAL | Age: 64
End: 2024-01-21
Payer: COMMERCIAL

## 2024-01-21 ENCOUNTER — HOSPITAL ENCOUNTER (EMERGENCY)
Facility: HOSPITAL | Age: 64
Discharge: HOME | End: 2024-01-21
Attending: EMERGENCY MEDICINE
Payer: COMMERCIAL

## 2024-01-21 ENCOUNTER — APPOINTMENT (OUTPATIENT)
Dept: RADIOLOGY | Facility: HOSPITAL | Age: 64
End: 2024-01-21
Payer: COMMERCIAL

## 2024-01-21 VITALS
HEIGHT: 66 IN | OXYGEN SATURATION: 96 % | HEART RATE: 74 BPM | BODY MASS INDEX: 37.12 KG/M2 | SYSTOLIC BLOOD PRESSURE: 133 MMHG | RESPIRATION RATE: 16 BRPM | DIASTOLIC BLOOD PRESSURE: 83 MMHG | TEMPERATURE: 97.9 F

## 2024-01-21 DIAGNOSIS — M54.10 RADICULOPATHY OF ARM: Primary | ICD-10-CM

## 2024-01-21 LAB
ALBUMIN SERPL BCP-MCNC: 4.2 G/DL (ref 3.4–5)
ALP SERPL-CCNC: 77 U/L (ref 33–136)
ALT SERPL W P-5'-P-CCNC: 25 U/L (ref 7–45)
ANION GAP SERPL CALC-SCNC: 13 MMOL/L (ref 10–20)
AST SERPL W P-5'-P-CCNC: 25 U/L (ref 9–39)
BASOPHILS # BLD AUTO: 0.05 X10*3/UL (ref 0–0.1)
BASOPHILS NFR BLD AUTO: 0.7 %
BILIRUB SERPL-MCNC: 0.3 MG/DL (ref 0–1.2)
BNP SERPL-MCNC: 19 PG/ML (ref 0–99)
BUN SERPL-MCNC: 19 MG/DL (ref 6–23)
CALCIUM SERPL-MCNC: 10.1 MG/DL (ref 8.6–10.6)
CARDIAC TROPONIN I PNL SERPL HS: 3 NG/L (ref 0–34)
CHLORIDE SERPL-SCNC: 100 MMOL/L (ref 98–107)
CO2 SERPL-SCNC: 30 MMOL/L (ref 21–32)
CREAT SERPL-MCNC: 0.65 MG/DL (ref 0.5–1.05)
EGFRCR SERPLBLD CKD-EPI 2021: >90 ML/MIN/1.73M*2
EOSINOPHIL # BLD AUTO: 0.12 X10*3/UL (ref 0–0.7)
EOSINOPHIL NFR BLD AUTO: 1.7 %
ERYTHROCYTE [DISTWIDTH] IN BLOOD BY AUTOMATED COUNT: 14.9 % (ref 11.5–14.5)
GLUCOSE SERPL-MCNC: 229 MG/DL (ref 74–99)
HCT VFR BLD AUTO: 37.4 % (ref 36–46)
HGB BLD-MCNC: 13.1 G/DL (ref 12–16)
IMM GRANULOCYTES # BLD AUTO: 0.01 X10*3/UL (ref 0–0.7)
IMM GRANULOCYTES NFR BLD AUTO: 0.1 % (ref 0–0.9)
INR PPP: 1.1 (ref 0.9–1.1)
LYMPHOCYTES # BLD AUTO: 2.82 X10*3/UL (ref 1.2–4.8)
LYMPHOCYTES NFR BLD AUTO: 40.9 %
MCH RBC QN AUTO: 28.5 PG (ref 26–34)
MCHC RBC AUTO-ENTMCNC: 35 G/DL (ref 32–36)
MCV RBC AUTO: 82 FL (ref 80–100)
MONOCYTES # BLD AUTO: 0.42 X10*3/UL (ref 0.1–1)
MONOCYTES NFR BLD AUTO: 6.1 %
NEUTROPHILS # BLD AUTO: 3.47 X10*3/UL (ref 1.2–7.7)
NEUTROPHILS NFR BLD AUTO: 50.5 %
NRBC BLD-RTO: 0 /100 WBCS (ref 0–0)
PLATELET # BLD AUTO: 269 X10*3/UL (ref 150–450)
POTASSIUM SERPL-SCNC: 3.5 MMOL/L (ref 3.5–5.3)
PROT SERPL-MCNC: 7.4 G/DL (ref 6.4–8.2)
PROTHROMBIN TIME: 11.9 SECONDS (ref 9.8–12.8)
RBC # BLD AUTO: 4.59 X10*6/UL (ref 4–5.2)
SODIUM SERPL-SCNC: 139 MMOL/L (ref 136–145)
WBC # BLD AUTO: 6.9 X10*3/UL (ref 4.4–11.3)

## 2024-01-21 PROCEDURE — 99284 EMERGENCY DEPT VISIT MOD MDM: CPT | Performed by: EMERGENCY MEDICINE

## 2024-01-21 PROCEDURE — 99283 EMERGENCY DEPT VISIT LOW MDM: CPT

## 2024-01-21 PROCEDURE — 85025 COMPLETE CBC W/AUTO DIFF WBC: CPT | Performed by: EMERGENCY MEDICINE

## 2024-01-21 PROCEDURE — 93005 ELECTROCARDIOGRAM TRACING: CPT

## 2024-01-21 PROCEDURE — 36415 COLL VENOUS BLD VENIPUNCTURE: CPT | Performed by: EMERGENCY MEDICINE

## 2024-01-21 PROCEDURE — 71045 X-RAY EXAM CHEST 1 VIEW: CPT | Mod: FOREIGN READ | Performed by: RADIOLOGY

## 2024-01-21 PROCEDURE — 85610 PROTHROMBIN TIME: CPT | Performed by: EMERGENCY MEDICINE

## 2024-01-21 PROCEDURE — 84484 ASSAY OF TROPONIN QUANT: CPT | Performed by: EMERGENCY MEDICINE

## 2024-01-21 PROCEDURE — 71045 X-RAY EXAM CHEST 1 VIEW: CPT

## 2024-01-21 PROCEDURE — 83880 ASSAY OF NATRIURETIC PEPTIDE: CPT | Performed by: EMERGENCY MEDICINE

## 2024-01-21 PROCEDURE — 80053 COMPREHEN METABOLIC PANEL: CPT | Performed by: EMERGENCY MEDICINE

## 2024-01-21 ASSESSMENT — LIFESTYLE VARIABLES
EVER FELT BAD OR GUILTY ABOUT YOUR DRINKING: NO
EVER HAD A DRINK FIRST THING IN THE MORNING TO STEADY YOUR NERVES TO GET RID OF A HANGOVER: NO
REASON UNABLE TO ASSESS: NO
HAVE PEOPLE ANNOYED YOU BY CRITICIZING YOUR DRINKING: NO
HAVE YOU EVER FELT YOU SHOULD CUT DOWN ON YOUR DRINKING: NO

## 2024-01-21 ASSESSMENT — COLUMBIA-SUICIDE SEVERITY RATING SCALE - C-SSRS
2. HAVE YOU ACTUALLY HAD ANY THOUGHTS OF KILLING YOURSELF?: NO
6. HAVE YOU EVER DONE ANYTHING, STARTED TO DO ANYTHING, OR PREPARED TO DO ANYTHING TO END YOUR LIFE?: NO
1. IN THE PAST MONTH, HAVE YOU WISHED YOU WERE DEAD OR WISHED YOU COULD GO TO SLEEP AND NOT WAKE UP?: NO

## 2024-01-21 ASSESSMENT — PAIN SCALES - GENERAL: PAINLEVEL_OUTOF10: 0 - NO PAIN

## 2024-01-21 ASSESSMENT — PAIN - FUNCTIONAL ASSESSMENT: PAIN_FUNCTIONAL_ASSESSMENT: 0-10

## 2024-01-21 ASSESSMENT — PAIN DESCRIPTION - DESCRIPTORS: DESCRIPTORS: ACHING

## 2024-01-21 NOTE — ED TRIAGE NOTES
"Pt states that she had a sudden \"shock\" in her heart and went tingling down her left arm. Feeling has since resolved   "

## 2024-01-21 NOTE — ED PROVIDER NOTES
"HPI   Chief Complaint   Patient presents with    Chest Pain       HPI     Patient is a 64-year-old female with past medical history significant for HTN, HLD, and DM presenting to the emergency department for chest pain. Patient states that she was at home standing up cooking when she felt a sudden \"shock\" to her heart. This sensation of this painful jolt was less than 1 second. She then had an approximate 5 seconds bout of dizziness but did not lose consciousness or fall. She currently reports feeling back to her baseline and like her normal self without chest pain or shortness of breath. She has no history of cardiac pacemaker. No history of A-fib. No known traumatic mechanism. No cough, fevers, body aches, chills at home.               [place] Coma Scale Score: 15                  Patient History   Past Medical History:   Diagnosis Date    Conduction disorder, unspecified 06/27/2019    Skipped heart beats    COVID-19 04/28/2020    COVID-19 virus infection    Cramp and spasm 06/21/2019    Limb cramp    Cramp and spasm 07/27/2016    Limb cramps    Disorder of pigmentation, unspecified 07/27/2016    Discoloration of skin of toe    Dorsalgia, unspecified 09/20/2017    Musculoskeletal back pain    Effusion, left knee 10/24/2018    Effusion of left knee    Encounter for other general examination 10/22/2019    Encounter for biometric screening    Encounter for other screening for malignant neoplasm of breast 04/28/2021    Screening for breast cancer    Essential (primary) hypertension 11/14/2022    Hypertension    Immunization not carried out because of patient refusal 10/28/2018    Influenza vaccination declined by patient    Morbid (severe) obesity due to excess calories (CMS/HCC) 01/07/2022    Class 2 severe obesity with serious comorbidity and body mass index (BMI) of 37.0 to 37.9 in adult    Morbid (severe) obesity due to excess calories (CMS/HCC) 12/07/2020    Class 2 severe obesity with serious comorbidity and " body mass index (BMI) of 37.0 to 37.9 in adult    Myalgia, other site 04/28/2020    Musculoskeletal pain    Obstructive sleep apnea (adult) (pediatric)     Obstructive sleep apnea, adult    Other chest pain 08/25/2022    Atypical chest pain    Other fecal abnormalities 05/21/2019    Loose stools    Personal history of diseases of the blood and blood-forming organs and certain disorders involving the immune mechanism     History of anemia    Personal history of other diseases of the circulatory system     History of cardiac arrhythmia    Personal history of other diseases of the circulatory system 10/25/2019    History of hypertension    Personal history of other diseases of the musculoskeletal system and connective tissue     History of gout    Personal history of other diseases of the respiratory system 12/20/2019    History of acute bronchitis    Personal history of other endocrine, nutritional and metabolic disease     History of hyperlipidemia    Personal history of other endocrine, nutritional and metabolic disease     History of obesity    Personal history of other endocrine, nutritional and metabolic disease 06/20/2016    History of hypokalemia    Personal history of other endocrine, nutritional and metabolic disease 04/03/2018    History of hypokalemia    Personal history of other specified conditions     History of vertigo    Personal history of other specified conditions 06/08/2020    History of dizziness    Personal history of other specified conditions 03/05/2019    History of diarrhea    Personal history of other specified conditions 06/30/2020    History of abdominal pain    Personal history of other specified conditions 07/29/2019    History of palpitations    Personal history of other specified conditions 06/20/2016    History of nausea    Personal history of urinary (tract) infections 09/20/2017    History of urinary tract infection    Pleurodynia 12/20/2019    Rib pain on left side    Type 2  diabetes mellitus without complications (CMS/[facility]) 10/25/2019    Diabetes mellitus    Unspecified symptoms and signs involving the genitourinary system     UTI symptoms     Past Surgical History:   Procedure Laterality Date    CT ANGIO HEART CORONARY  3/16/2022    CT HEART CORONARY ANGIOGRAM 3/16/2022 [facility] [place] EMERGENCY LEGACY    HYSTERECTOMY  08/23/2021    Hysterectomy    OTHER SURGICAL HISTORY  10/25/2019    Knee replacement    OTHER SURGICAL HISTORY  10/25/2019    Foot surgery     No family history on file.  Social History     Tobacco Use    Smoking status: Never    Smokeless tobacco: Never   Substance Use Topics    Alcohol use: Never    Drug use: Never       Physical Exam   ED Triage Vitals [01/21/24 1338]   Temp Heart Rate Respirations BP   36.6 °C (97.9 °F) 74 16 133/83      Pulse Ox Temp Source Heart Rate Source Patient Position   96 % Tympanic -- --      BP Location FiO2 (%)     -- --       Physical Exam  Vitals and nursing note reviewed.   HENT:      Head: Normocephalic and atraumatic.      Nose: No rhinorrhea.      Mouth/Throat:      Mouth: Mucous membranes are moist.      Pharynx: Oropharynx is clear. No oropharyngeal exudate or posterior oropharyngeal erythema.   Eyes:      General: No scleral icterus.     Extraocular Movements: Extraocular movements intact.      Conjunctiva/sclera: Conjunctivae normal.      Pupils: Pupils are equal, round, and reactive to light.   Cardiovascular:      Rate and Rhythm: Normal rate and regular rhythm.      Pulses: Normal pulses.      Heart sounds: Normal heart sounds. No murmur heard.  Pulmonary:      Effort: Pulmonary effort is normal.      Breath sounds: Normal breath sounds. No wheezing, rhonchi or rales.   Abdominal:      Palpations: Abdomen is soft. There is no mass.      Tenderness: There is no abdominal tenderness. There is no guarding or rebound.   Musculoskeletal:         General: No swelling, tenderness, deformity or signs of injury. Normal range of motion.       Cervical back: Normal range of motion and neck supple.   Skin:     General: Skin is warm and dry.      Capillary Refill: Capillary refill takes less than 2 seconds.      Coloration: Skin is not jaundiced.      Findings: No rash.   Neurological:      General: No focal deficit present.      Mental Status: She is alert and oriented to person, place, and time.      Cranial Nerves: No cranial nerve deficit.      Sensory: No sensory deficit.      Motor: No weakness.   Psychiatric:         Mood and Affect: Mood normal.         Behavior: Behavior normal.         Thought Content: Thought content normal.         ED Course & MDM   ED Course as of 01/21/24 2107   [place] Jan 21, 2024   5986 Senior staffing note: Patient is a 64-year-old female with past medical history significant for hypertension, hyperlipidemia, diabetes all of which patient reports control with medications who is presenting emergency department with a chief concern of acute onset left arm pain.  Patient reports that she was working in the kitchen after clearing snow with a manual push snowblower when she suddenly had a jolt through her arm.  Patient states that she had a fleeting episode of lightheadedness and then return to baseline.  Patient reports a strong family history of cardiac disease and therefore presented to the emergency department for further evaluation.  Patient reports similar presentation in the past, states that she does have musculoskeletal issues with her left shoulder with noted weakness on extremes of flexion/extension.  Patient states that she is currently at baseline, EKG is reviewed, troponin is noted to be 3 patient has a heart score of 3.  She is appropriate for symptomatic control, follow-up with cardiology.  I otherwise agree with the resident evaluation, assessment, plan. [BN]      ED Course User Index  [BN] Trino Watson MD         Diagnoses as of 01/21/24 2107   Radiculopathy of arm       Medical Decision Making   Patient is a  64-year-old female with past medical history significant for HTN, HLD, and DM presenting to the emergency department for chest pain. Patient currently feels back to her baseline without any chest pain or shortness of breath. Patient's description of sudden shock with several seconds of dizziness following concerning for potential arrhythmia although unlikely as pain seems to have radiated from the wrist to her chest rather than chest to her wrist. Lower concern for ACS at this time as patient was using snowblower earlier and not experiencing chest pain. Additionally, she never had any shortness of breath or pain currently. Physical exam is reassuring with +2 pulses radial and DPs. CMP and CBC were obtained which does not show any evidence of leukocytosis, anemia, or electrolyte derangements. Troponin within normal limits as well as BMP. X-ray shows no evidence of acute intra cardiopulmonary processes. Given the normal and negative workup so far, overall low concern for pneumonia and ACS. Patient has heart score of 3. She is appropriate for outpatient symptomatic control and follow-up with her PCP on an outpatient basis. As a result of the work-up, patient was discharged home.  They were informed of their diagnosis and instructed to come back with any concerns or worsening of condition and was agreeable to the plan as discussed above.  The patient was given the opportunity to ask questions.  All of the patient's questions were answered.  The patient remained stable under my care.    Procedure  Procedures     Bhargav Quinones MD  Resident  01/21/24 5959

## 2024-01-21 NOTE — DISCHARGE INSTRUCTIONS
Today you were seen and evaluated for left arm and left chest pain. After thorough workup, we are confident that you are not having a heart attack and that pain is most likely not coming from your heart. Your x-ray and blood work was largely reassuring. The most likely cause of your pain was an irritated nerve in your arm. If you experience ongoing symptoms or worsening of your condition, please return back to the emergency department.

## 2024-01-22 LAB
ATRIAL RATE: 68 BPM
P AXIS: 102 DEGREES
P OFFSET: 174 MS
P ONSET: 118 MS
PR INTERVAL: 202 MS
Q ONSET: 219 MS
QRS COUNT: 11 BEATS
QRS DURATION: 68 MS
QT INTERVAL: 392 MS
QTC CALCULATION(BAZETT): 416 MS
QTC FREDERICIA: 408 MS
R AXIS: 150 DEGREES
T AXIS: 142 DEGREES
T OFFSET: 415 MS
VENTRICULAR RATE: 68 BPM

## 2024-02-21 DIAGNOSIS — E66.9 DIABETES MELLITUS TYPE 2 IN OBESE: Primary | ICD-10-CM

## 2024-02-21 DIAGNOSIS — E11.69 DIABETES MELLITUS TYPE 2 IN OBESE: Primary | ICD-10-CM

## 2024-03-02 RX ORDER — METFORMIN HYDROCHLORIDE 500 MG/1
TABLET ORAL
Qty: 450 TABLET | Refills: 3 | Status: SHIPPED | OUTPATIENT
Start: 2024-03-02

## 2024-03-19 ENCOUNTER — OFFICE VISIT (OUTPATIENT)
Dept: PRIMARY CARE | Facility: CLINIC | Age: 64
End: 2024-03-19
Payer: COMMERCIAL

## 2024-03-19 ENCOUNTER — LAB (OUTPATIENT)
Dept: LAB | Facility: LAB | Age: 64
End: 2024-03-19
Payer: COMMERCIAL

## 2024-03-19 ENCOUNTER — HOSPITAL ENCOUNTER (OUTPATIENT)
Dept: RADIOLOGY | Facility: CLINIC | Age: 64
Discharge: HOME | End: 2024-03-19
Payer: COMMERCIAL

## 2024-03-19 VITALS
HEIGHT: 66 IN | BODY MASS INDEX: 36.45 KG/M2 | RESPIRATION RATE: 15 BRPM | TEMPERATURE: 97.9 F | DIASTOLIC BLOOD PRESSURE: 70 MMHG | OXYGEN SATURATION: 95 % | WEIGHT: 226.8 LBS | HEART RATE: 71 BPM | SYSTOLIC BLOOD PRESSURE: 128 MMHG

## 2024-03-19 DIAGNOSIS — M79.641 PAIN OF RIGHT HAND: ICD-10-CM

## 2024-03-19 DIAGNOSIS — Z12.31 ENCOUNTER FOR SCREENING MAMMOGRAM FOR BREAST CANCER: ICD-10-CM

## 2024-03-19 DIAGNOSIS — Z00.00 WELLNESS EXAMINATION: Primary | ICD-10-CM

## 2024-03-19 DIAGNOSIS — Z23 NEED FOR VACCINATION: ICD-10-CM

## 2024-03-19 DIAGNOSIS — E11.69 DIABETES MELLITUS TYPE 2 IN OBESE: ICD-10-CM

## 2024-03-19 DIAGNOSIS — E66.9 DIABETES MELLITUS TYPE 2 IN OBESE: ICD-10-CM

## 2024-03-19 DIAGNOSIS — I10 ESSENTIAL HYPERTENSION: ICD-10-CM

## 2024-03-19 PROBLEM — Z86.2 HISTORY OF ANEMIA: Status: ACTIVE | Noted: 2024-03-19

## 2024-03-19 PROBLEM — Z86.39 HISTORY OF OBESITY: Status: ACTIVE | Noted: 2024-03-19

## 2024-03-19 PROBLEM — R00.2 PALPITATIONS: Status: ACTIVE | Noted: 2024-03-19

## 2024-03-19 PROBLEM — R53.83 FATIGUE: Status: ACTIVE | Noted: 2023-07-18

## 2024-03-19 PROBLEM — D12.5 BENIGN NEOPLASM OF SIGMOID COLON: Status: ACTIVE | Noted: 2023-09-14

## 2024-03-19 PROBLEM — R11.0 NAUSEA: Status: ACTIVE | Noted: 2024-03-19

## 2024-03-19 PROBLEM — N39.0 URINARY TRACT INFECTION: Status: ACTIVE | Noted: 2024-03-19

## 2024-03-19 PROBLEM — Z86.79 HISTORY OF CARDIAC ARRHYTHMIA: Status: ACTIVE | Noted: 2024-03-19

## 2024-03-19 PROBLEM — R05.9 COUGH: Status: ACTIVE | Noted: 2024-03-19

## 2024-03-19 PROBLEM — G80.9 CEREBRAL PALSY (MULTI): Status: RESOLVED | Noted: 2024-03-19 | Resolved: 2024-03-19

## 2024-03-19 PROBLEM — R42 DIZZINESS: Status: ACTIVE | Noted: 2024-03-19

## 2024-03-19 PROBLEM — J20.9 ACUTE BRONCHITIS: Status: ACTIVE | Noted: 2024-03-19

## 2024-03-19 PROBLEM — Z86.79 HISTORY OF HYPERTENSION: Status: ACTIVE | Noted: 2024-03-19

## 2024-03-19 PROBLEM — M77.30 CALCANEAL SPUR: Status: ACTIVE | Noted: 2024-03-19

## 2024-03-19 PROBLEM — G80.9 CEREBRAL PALSY (MULTI): Status: ACTIVE | Noted: 2024-03-19

## 2024-03-19 PROBLEM — R19.7 DIARRHEA: Status: ACTIVE | Noted: 2024-03-19

## 2024-03-19 PROBLEM — K57.30 DIVERTICULOSIS OF LARGE INTESTINE: Status: ACTIVE | Noted: 2023-09-14

## 2024-03-19 PROBLEM — K64.4 RESIDUAL HEMORRHOIDAL SKIN TAGS: Status: ACTIVE | Noted: 2023-09-14

## 2024-03-19 LAB
CREAT UR-MCNC: 206.8 MG/DL (ref 20–320)
EST. AVERAGE GLUCOSE BLD GHB EST-MCNC: 137 MG/DL
HBA1C MFR BLD: 6.4 %
MICROALBUMIN UR-MCNC: 11.5 MG/L
MICROALBUMIN/CREAT UR: 5.6 UG/MG CREAT
TSH SERPL-ACNC: 0.44 MIU/L (ref 0.44–3.98)

## 2024-03-19 PROCEDURE — 1160F RVW MEDS BY RX/DR IN RCRD: CPT | Performed by: FAMILY MEDICINE

## 2024-03-19 PROCEDURE — 90715 TDAP VACCINE 7 YRS/> IM: CPT | Performed by: FAMILY MEDICINE

## 2024-03-19 PROCEDURE — 99397 PER PM REEVAL EST PAT 65+ YR: CPT | Performed by: FAMILY MEDICINE

## 2024-03-19 PROCEDURE — 3074F SYST BP LT 130 MM HG: CPT | Performed by: FAMILY MEDICINE

## 2024-03-19 PROCEDURE — 73130 X-RAY EXAM OF HAND: CPT | Mod: RT

## 2024-03-19 PROCEDURE — 3044F HG A1C LEVEL LT 7.0%: CPT | Performed by: FAMILY MEDICINE

## 2024-03-19 PROCEDURE — 83036 HEMOGLOBIN GLYCOSYLATED A1C: CPT

## 2024-03-19 PROCEDURE — 1036F TOBACCO NON-USER: CPT | Performed by: FAMILY MEDICINE

## 2024-03-19 PROCEDURE — 3061F NEG MICROALBUMINURIA REV: CPT | Performed by: FAMILY MEDICINE

## 2024-03-19 PROCEDURE — 84443 ASSAY THYROID STIM HORMONE: CPT

## 2024-03-19 PROCEDURE — 3078F DIAST BP <80 MM HG: CPT | Performed by: FAMILY MEDICINE

## 2024-03-19 PROCEDURE — 82043 UR ALBUMIN QUANTITATIVE: CPT

## 2024-03-19 PROCEDURE — 4010F ACE/ARB THERAPY RXD/TAKEN: CPT | Performed by: FAMILY MEDICINE

## 2024-03-19 PROCEDURE — 1159F MED LIST DOCD IN RCRD: CPT | Performed by: FAMILY MEDICINE

## 2024-03-19 PROCEDURE — 36415 COLL VENOUS BLD VENIPUNCTURE: CPT

## 2024-03-19 PROCEDURE — 82570 ASSAY OF URINE CREATININE: CPT

## 2024-03-19 PROCEDURE — 73130 X-RAY EXAM OF HAND: CPT | Mod: RIGHT SIDE | Performed by: RADIOLOGY

## 2024-03-19 PROCEDURE — 90471 IMMUNIZATION ADMIN: CPT | Performed by: FAMILY MEDICINE

## 2024-03-19 PROCEDURE — 99213 OFFICE O/P EST LOW 20 MIN: CPT | Performed by: FAMILY MEDICINE

## 2024-03-19 NOTE — PATIENT INSTRUCTIONS
Tdap today    COVID and RSV- can get from your local pharm  Declined flu      Dm  - labs today  - cont meds      HTN  - Your blood pressure is at goal today- goal is less than 130/80  - Continue your current medications  - Weight loss can help lower your bp!  Work on a healthy whole food diet and add at least 30min of exercise 5 days per week  - Work on a low salt diet        Please follow up in  6months for HTN/DM or as needed.       ** If labs or imaging ordered at today's visit, all the non-urgent results will be discussed at your next visit    If you have been referred for a special test or to a specialist please call  [phone] to schedule an appointment.  If you have any further questions, or if develop new or worsened symptoms, please give our office a call at [phone].

## 2024-03-19 NOTE — PROGRESS NOTES
"Subjective   Patient ID: Marina Menezes is a 64 y.o. female who presents for Annual Exam.    HPI     Here for a physical  Does not want a chaperone.     Health Maintenance:  - Colonoscopy: 9/14/23- repeat in 5 yrs  - Mammogram: 8/2/24- ordered for future  - PAP: Hyster  - Bone density DEXA: 10/18/23- osteopenia  Immunizations:  - COVID vaccination status: Completed 2/4  - Others:Shingix/ tdap      Other concerns/issues/followup:  1 -Dm- last hba1c 6.5  Repeating today    2 -  last TSH 0.4  Repeating today    3 -  HTN  BP at goal today in office.  Using medications without issues.  Denies CP, SOB, palpitations, change in vision, dizziness, N/V.     4- hand and finger swelling and pain  With nodule      PMSFH was reviewed & updated.     ---Social---  Job:  assistant   :    Kids: 4 (5 grands)  Likes/hobbies: travel/ cruising      ETOH - occ  Drugs - none  Tobacco - quit      Review of Systems  All systems reviewed and neg if not noted in the HPI above     Objective   /70 (Patient Position: Sitting)   Pulse 71   Temp 36.6 °C (97.9 °F)   Resp 15   Ht 1.676 m (5' 6\")   Wt 103 kg (226 lb 12.8 oz)   SpO2 95%   BMI 36.61 kg/m²     Physical Exam  Pleasant  Eyes: conjunctiva non-icteric and eye lids are without obvious rash or drooping. Pupils are symmetric.   Ears, Nose, Mouth, and Throat: External ears and nose appear to be without deformity or rash. No lesions or masses noted. Hearing is grossly intact.   CV: RRR, no murmur  Pulm:CTA B/L  Abd: soft, NTTP, + BS  LE: no edema  Psychiatric: Alert, orientation to person, place, and time. Recent/remote memory as evidenced through face-to-face interaction and discussion appear grossly intact. Mood and affect are normal.   Right 5th finger with swelling and nodule    Assessment/Plan   Problem List Items Addressed This Visit             ICD-10-CM    Diabetes mellitus type 2 in obese (CMS/HCC) E11.69, E66.9     - Stable  - Continue to " monitor  Checking labs  Last hba1c 6.5         Relevant Orders    Hemoglobin A1C    Albumin , Urine Random    TSH with reflex to Free T4 if abnormal    Essential hypertension  HTN  - Your blood pressure is at goal today- goal is less than 130/80  - Continue your current medications  - Weight loss can help lower your bp!  Work on a healthy whole food diet and add at least 30min of exercise 5 days per week  - Work on a low salt diet   I10    Pain of right hand M79.641    Relevant Orders    Referral to Orthopaedic Surgery    XR hand right 3+ views     Other Visit Diagnoses         Codes    Wellness examination    -  Primary  Continue to work on healthy diet and exercise  We encourage all patients to engage in exercise 150 minutes per week   Adults 18 and older, activity during each week - if you are able:    Engage in at least 150 minutes of moderate or vigorous exercise per week   If you are able- Engage in muscle strengthening activity on 2 or more days per week   Z00.00    Encounter for screening mammogram for breast cancer     Z12.31    Relevant Orders    BI mammo bilateral screening tomosynthesis        Need for vaccination [Z23]  Other orders  -     Tdap vaccine, age 7 years and older          Please follow up in  6months for HTN/DM or as needed.

## 2024-03-19 NOTE — LETTER
March 19, 2024     Patient: Marina Menezes   YOB: 1960   Date of Visit: 3/19/2024       To Whom It May Concern:    Marina Menezes was seen in my clinic on 3/19/2024. Please excuse Marina for her absence from work on this day to make the appointment.    If you have any questions or concerns, please don't hesitate to call.         Sincerely,         Sia Menezes, DO        CC: No Recipients

## 2024-05-06 DIAGNOSIS — E11.69 TYPE 2 DIABETES MELLITUS WITH OBESITY (MULTI): ICD-10-CM

## 2024-05-06 DIAGNOSIS — E66.9 TYPE 2 DIABETES MELLITUS WITH OBESITY (MULTI): ICD-10-CM

## 2024-05-07 RX ORDER — DULAGLUTIDE 4.5 MG/.5ML
INJECTION, SOLUTION SUBCUTANEOUS
Qty: 6 ML | Refills: 0 | Status: SHIPPED | OUTPATIENT
Start: 2024-05-07 | End: 2024-05-22 | Stop reason: SDUPTHER

## 2024-05-21 ENCOUNTER — PATIENT MESSAGE (OUTPATIENT)
Dept: PRIMARY CARE | Facility: CLINIC | Age: 64
End: 2024-05-21
Payer: COMMERCIAL

## 2024-05-21 DIAGNOSIS — E11.69 TYPE 2 DIABETES MELLITUS WITH OBESITY (MULTI): ICD-10-CM

## 2024-05-21 DIAGNOSIS — E66.9 TYPE 2 DIABETES MELLITUS WITH OBESITY (MULTI): ICD-10-CM

## 2024-05-22 RX ORDER — DULAGLUTIDE 4.5 MG/.5ML
INJECTION, SOLUTION SUBCUTANEOUS
Qty: 6 ML | Refills: 0 | Status: SHIPPED | OUTPATIENT
Start: 2024-05-22

## 2024-05-22 NOTE — TELEPHONE ENCOUNTER
From: Marina Menezes  To: Sia Menezes  Sent: 5/21/2024 7:43 PM EDT  Subject: Prescription     Good evening Dr. HONEYCUTT need a order for trulisity

## 2024-06-03 RX ORDER — DULAGLUTIDE 1.5 MG/.5ML
1.5 INJECTION, SOLUTION SUBCUTANEOUS
Qty: 2 ML | Refills: 0 | Status: SHIPPED | OUTPATIENT
Start: 2024-06-09

## 2024-06-04 RX ORDER — DULAGLUTIDE 0.75 MG/.5ML
0.75 INJECTION, SOLUTION SUBCUTANEOUS
Qty: 2 ML | Refills: 0 | Status: SHIPPED | OUTPATIENT
Start: 2024-06-09

## 2024-06-24 ENCOUNTER — PATIENT MESSAGE (OUTPATIENT)
Dept: PRIMARY CARE | Facility: CLINIC | Age: 64
End: 2024-06-24
Payer: COMMERCIAL

## 2024-06-25 NOTE — TELEPHONE ENCOUNTER
From: Marina Menezes  To: Sia Menezes  Sent: 6/24/2024 2:59 PM EDT  Subject: [facility] cross    hello can i give blood to the red cross im dibetic is it ok

## 2024-08-02 ENCOUNTER — HOSPITAL ENCOUNTER (OUTPATIENT)
Dept: RADIOLOGY | Facility: CLINIC | Age: 64
Discharge: HOME | End: 2024-08-02
Payer: COMMERCIAL

## 2024-08-02 VITALS — BODY MASS INDEX: 35.51 KG/M2 | WEIGHT: 220 LBS

## 2024-08-02 DIAGNOSIS — Z12.31 ENCOUNTER FOR SCREENING MAMMOGRAM FOR BREAST CANCER: ICD-10-CM

## 2024-08-02 PROCEDURE — 77067 SCR MAMMO BI INCL CAD: CPT

## 2024-08-14 DIAGNOSIS — I10 ESSENTIAL (PRIMARY) HYPERTENSION: ICD-10-CM

## 2024-08-20 RX ORDER — POTASSIUM CHLORIDE 1500 MG/1
20 TABLET, EXTENDED RELEASE ORAL DAILY
Qty: 90 TABLET | Refills: 1 | Status: SHIPPED | OUTPATIENT
Start: 2024-08-20

## 2024-09-11 ENCOUNTER — APPOINTMENT (OUTPATIENT)
Dept: PRIMARY CARE | Facility: CLINIC | Age: 64
End: 2024-09-11
Payer: COMMERCIAL

## 2024-09-18 ENCOUNTER — APPOINTMENT (OUTPATIENT)
Dept: PRIMARY CARE | Facility: CLINIC | Age: 64
End: 2024-09-18
Payer: COMMERCIAL

## 2024-09-27 ENCOUNTER — APPOINTMENT (OUTPATIENT)
Dept: PRIMARY CARE | Facility: CLINIC | Age: 64
End: 2024-09-27
Payer: COMMERCIAL

## 2024-10-08 ENCOUNTER — LAB (OUTPATIENT)
Dept: LAB | Facility: LAB | Age: 64
End: 2024-10-08
Payer: COMMERCIAL

## 2024-10-08 ENCOUNTER — APPOINTMENT (OUTPATIENT)
Dept: PRIMARY CARE | Facility: CLINIC | Age: 64
End: 2024-10-08
Payer: COMMERCIAL

## 2024-10-08 ENCOUNTER — PHARMACY VISIT (OUTPATIENT)
Dept: PHARMACY | Facility: CLINIC | Age: 64
End: 2024-10-08
Payer: MEDICARE

## 2024-10-08 VITALS
DIASTOLIC BLOOD PRESSURE: 70 MMHG | HEART RATE: 68 BPM | SYSTOLIC BLOOD PRESSURE: 120 MMHG | WEIGHT: 233.5 LBS | BODY MASS INDEX: 37.52 KG/M2 | TEMPERATURE: 97.6 F | OXYGEN SATURATION: 95 % | HEIGHT: 66 IN | RESPIRATION RATE: 15 BRPM

## 2024-10-08 DIAGNOSIS — Z11.59 ENCOUNTER FOR HEPATITIS C SCREENING TEST FOR LOW RISK PATIENT: ICD-10-CM

## 2024-10-08 DIAGNOSIS — E11.69 TYPE 2 DIABETES MELLITUS WITH OBESITY (MULTI): ICD-10-CM

## 2024-10-08 DIAGNOSIS — E78.5 HYPERLIPIDEMIA, UNSPECIFIED HYPERLIPIDEMIA TYPE: ICD-10-CM

## 2024-10-08 DIAGNOSIS — E66.9 TYPE 2 DIABETES MELLITUS WITH OBESITY (MULTI): ICD-10-CM

## 2024-10-08 DIAGNOSIS — I10 ESSENTIAL HYPERTENSION: ICD-10-CM

## 2024-10-08 DIAGNOSIS — E78.5 HYPERLIPIDEMIA, UNSPECIFIED HYPERLIPIDEMIA TYPE: Primary | ICD-10-CM

## 2024-10-08 PROBLEM — J20.9 ACUTE BRONCHITIS: Status: RESOLVED | Noted: 2024-03-19 | Resolved: 2024-10-08

## 2024-10-08 PROBLEM — M89.9 SCAPULAR DYSFUNCTION: Status: RESOLVED | Noted: 2023-07-18 | Resolved: 2024-10-08

## 2024-10-08 PROBLEM — Z86.39 HISTORY OF OBESITY: Status: RESOLVED | Noted: 2024-03-19 | Resolved: 2024-10-08

## 2024-10-08 PROBLEM — M72.2 PLANTAR FASCIITIS OF LEFT FOOT: Status: RESOLVED | Noted: 2023-07-18 | Resolved: 2024-10-08

## 2024-10-08 PROBLEM — R05.9 COUGH: Status: RESOLVED | Noted: 2024-03-19 | Resolved: 2024-10-08

## 2024-10-08 PROBLEM — R05.8 COUGH WITH EXPOSURE TO COVID-19 VIRUS: Status: RESOLVED | Noted: 2023-07-18 | Resolved: 2024-10-08

## 2024-10-08 PROBLEM — Z86.2 HISTORY OF ANEMIA: Status: RESOLVED | Noted: 2024-03-19 | Resolved: 2024-10-08

## 2024-10-08 PROBLEM — R07.89 ATYPICAL CHEST PAIN: Status: RESOLVED | Noted: 2023-07-18 | Resolved: 2024-10-08

## 2024-10-08 PROBLEM — R09.81 CONGESTED NOSE: Status: RESOLVED | Noted: 2023-07-18 | Resolved: 2024-10-08

## 2024-10-08 PROBLEM — R10.9 FLANK PAIN: Status: RESOLVED | Noted: 2023-07-18 | Resolved: 2024-10-08

## 2024-10-08 PROBLEM — M54.50 LOW BACK PAIN: Status: RESOLVED | Noted: 2023-07-18 | Resolved: 2024-10-08

## 2024-10-08 PROBLEM — N39.0 URINARY TRACT INFECTION: Status: RESOLVED | Noted: 2024-03-19 | Resolved: 2024-10-08

## 2024-10-08 PROBLEM — R53.83 FATIGUE: Status: RESOLVED | Noted: 2023-07-18 | Resolved: 2024-10-08

## 2024-10-08 PROBLEM — R11.0 NAUSEA: Status: RESOLVED | Noted: 2024-03-19 | Resolved: 2024-10-08

## 2024-10-08 PROBLEM — E05.90 HYPERTHYROIDISM, SUBCLINICAL: Status: RESOLVED | Noted: 2023-07-18 | Resolved: 2024-10-08

## 2024-10-08 PROBLEM — Z20.822 COUGH WITH EXPOSURE TO COVID-19 VIRUS: Status: RESOLVED | Noted: 2023-07-18 | Resolved: 2024-10-08

## 2024-10-08 PROBLEM — E87.6 HYPOKALEMIA: Status: RESOLVED | Noted: 2023-07-18 | Resolved: 2024-10-08

## 2024-10-08 PROBLEM — M77.30 CALCANEAL SPUR: Status: RESOLVED | Noted: 2024-03-19 | Resolved: 2024-10-08

## 2024-10-08 PROBLEM — S46.812A STRAIN OF LEFT TRAPEZIUS MUSCLE: Status: RESOLVED | Noted: 2023-07-18 | Resolved: 2024-10-08

## 2024-10-08 PROBLEM — R42 DIZZINESS: Status: RESOLVED | Noted: 2024-03-19 | Resolved: 2024-10-08

## 2024-10-08 PROBLEM — R00.2 PALPITATIONS: Status: RESOLVED | Noted: 2024-03-19 | Resolved: 2024-10-08

## 2024-10-08 PROBLEM — M79.671 RIGHT FOOT PAIN: Status: RESOLVED | Noted: 2023-07-18 | Resolved: 2024-10-08

## 2024-10-08 PROBLEM — R19.7 DIARRHEA: Status: RESOLVED | Noted: 2024-03-19 | Resolved: 2024-10-08

## 2024-10-08 PROBLEM — Z86.79 HISTORY OF HYPERTENSION: Status: RESOLVED | Noted: 2024-03-19 | Resolved: 2024-10-08

## 2024-10-08 PROBLEM — M25.561 RIGHT KNEE PAIN: Status: RESOLVED | Noted: 2023-07-18 | Resolved: 2024-10-08

## 2024-10-08 PROBLEM — M70.62 TROCHANTERIC BURSITIS OF LEFT HIP: Status: RESOLVED | Noted: 2023-07-18 | Resolved: 2024-10-08

## 2024-10-08 LAB
ALBUMIN SERPL BCP-MCNC: 4.2 G/DL (ref 3.4–5)
ALP SERPL-CCNC: 79 U/L (ref 33–136)
ALT SERPL W P-5'-P-CCNC: 19 U/L (ref 7–45)
ANION GAP SERPL CALC-SCNC: 12 MMOL/L (ref 10–20)
AST SERPL W P-5'-P-CCNC: 21 U/L (ref 9–39)
BILIRUB SERPL-MCNC: 0.4 MG/DL (ref 0–1.2)
BUN SERPL-MCNC: 15 MG/DL (ref 6–23)
CALCIUM SERPL-MCNC: 10 MG/DL (ref 8.6–10.6)
CHLORIDE SERPL-SCNC: 103 MMOL/L (ref 98–107)
CHOLEST SERPL-MCNC: 100 MG/DL (ref 0–199)
CHOLESTEROL/HDL RATIO: 1.9
CO2 SERPL-SCNC: 33 MMOL/L (ref 21–32)
CREAT SERPL-MCNC: 0.75 MG/DL (ref 0.5–1.05)
EGFRCR SERPLBLD CKD-EPI 2021: 88 ML/MIN/1.73M*2
EST. AVERAGE GLUCOSE BLD GHB EST-MCNC: 137 MG/DL
GLUCOSE SERPL-MCNC: 110 MG/DL (ref 74–99)
HBA1C MFR BLD: 6.4 %
HCV AB SER QL: NONREACTIVE
HDLC SERPL-MCNC: 53.1 MG/DL
LDLC SERPL CALC-MCNC: 34 MG/DL
NON HDL CHOLESTEROL: 47 MG/DL (ref 0–149)
POTASSIUM SERPL-SCNC: 4.1 MMOL/L (ref 3.5–5.3)
PROT SERPL-MCNC: 7.4 G/DL (ref 6.4–8.2)
SODIUM SERPL-SCNC: 144 MMOL/L (ref 136–145)
TRIGL SERPL-MCNC: 64 MG/DL (ref 0–149)
VLDL: 13 MG/DL (ref 0–40)

## 2024-10-08 PROCEDURE — 3061F NEG MICROALBUMINURIA REV: CPT | Performed by: FAMILY MEDICINE

## 2024-10-08 PROCEDURE — 99213 OFFICE O/P EST LOW 20 MIN: CPT | Performed by: FAMILY MEDICINE

## 2024-10-08 PROCEDURE — 80061 LIPID PANEL: CPT

## 2024-10-08 PROCEDURE — 3074F SYST BP LT 130 MM HG: CPT | Performed by: FAMILY MEDICINE

## 2024-10-08 PROCEDURE — 86803 HEPATITIS C AB TEST: CPT

## 2024-10-08 PROCEDURE — 1159F MED LIST DOCD IN RCRD: CPT | Performed by: FAMILY MEDICINE

## 2024-10-08 PROCEDURE — 83036 HEMOGLOBIN GLYCOSYLATED A1C: CPT

## 2024-10-08 PROCEDURE — 3078F DIAST BP <80 MM HG: CPT | Performed by: FAMILY MEDICINE

## 2024-10-08 PROCEDURE — 4010F ACE/ARB THERAPY RXD/TAKEN: CPT | Performed by: FAMILY MEDICINE

## 2024-10-08 PROCEDURE — 1036F TOBACCO NON-USER: CPT | Performed by: FAMILY MEDICINE

## 2024-10-08 PROCEDURE — 3008F BODY MASS INDEX DOCD: CPT | Performed by: FAMILY MEDICINE

## 2024-10-08 PROCEDURE — 1160F RVW MEDS BY RX/DR IN RCRD: CPT | Performed by: FAMILY MEDICINE

## 2024-10-08 PROCEDURE — RXMED WILLOW AMBULATORY MEDICATION CHARGE

## 2024-10-08 PROCEDURE — 36415 COLL VENOUS BLD VENIPUNCTURE: CPT

## 2024-10-08 PROCEDURE — 80053 COMPREHEN METABOLIC PANEL: CPT

## 2024-10-08 PROCEDURE — 3044F HG A1C LEVEL LT 7.0%: CPT | Performed by: FAMILY MEDICINE

## 2024-10-08 ASSESSMENT — PATIENT HEALTH QUESTIONNAIRE - PHQ9
2. FEELING DOWN, DEPRESSED OR HOPELESS: NOT AT ALL
1. LITTLE INTEREST OR PLEASURE IN DOING THINGS: NOT AT ALL
SUM OF ALL RESPONSES TO PHQ9 QUESTIONS 1 AND 2: 0

## 2024-10-08 NOTE — PROGRESS NOTES
"Subjective   Patient ID: Marina Menezes is a 64 y.o. female who presents for Follow-up (Pt is here following up for HTN/DM.).    HPI       HTN  BP at goal today in office.  Using medications without issues.  Denies CP, SOB, palpitations, change in vision, dizziness, N/V.    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 Pupils are symmetric.   Ears, Nose, Mouth, and Throat: External ears and nose appear to be without deformity or rash. No lesions or masses noted. Hearing is grossly intact.   CV: RRR, no murmur  Pulm:CTA B/L  Abd: soft, NTTP, + BS  LE: no edema  Psychiatric: Alert, orientation to person, place, and time. Recent/remote memory as evidenced through face-to-face interaction and discussion appear grossly intact. Mood and affect are normal.      Assessment/Plan   Problem List Items Addressed This Visit             ICD-10-CM    Type 2 diabetes mellitus with obesity (Multi) E11.69, E66.9    Relevant Medications    dulaglutide 3 mg/0.5 mL pen injector    Other Relevant Orders    Lipid Panel    Hemoglobin A1C    Comprehensive Metabolic Panel    Essential hypertension I10    Relevant Orders    Lipid Panel    Comprehensive Metabolic Panel    Hyperlipidemia - Primary E78.5    Relevant Orders    Lipid Panel    Hemoglobin A1C    Comprehensive Metabolic Panel     Other Visit Diagnoses         Codes    Encounter for hepatitis C screening test " for low risk patient     Z11.59    Relevant Orders    Hepatitis C antibody                Please follow up in 6months for wellness or as needed.

## 2024-10-08 NOTE — PATIENT INSTRUCTIONS
Completed form    DM  Get labs today  Sent trulicity to  pharm  - upcoming appt with eye team    Declined flu/ pneumonia/ COVID/RSV/ Shingrix        HTN  - Your blood pressure is at goal today- goal is less than 130/80  - Continue your current medications  - Weight loss can help lower your bp!  Work on a healthy whole food diet and add at least 30min of exercise 5 days per week  - Work on a low salt diet        Please follow up in 6months for wellness or as needed.       ** If labs or imaging ordered at today's visit, all the non-urgent results will be discussed at your next visit    If you have been referred for a special test or to a specialist please call  [phone] to schedule an appointment.  If you have any further questions, or if develop new or worsened symptoms, please give our office a call at [phone].

## 2024-10-08 NOTE — LETTER
October 8, 2024     Patient: Marina Menezes   YOB: 1960   Date of Visit: 10/8/2024       To Whom It May Concern:    Marina Menezes was seen in my clinic on 10/8/2024. Please excuse Marina for her absence from work on this day to make the appointment.    If you have any questions or concerns, please don't hesitate to call.         Sincerely,         Sia Menezes, DO        CC: No Recipients

## 2024-10-14 DIAGNOSIS — E66.9 TYPE 2 DIABETES MELLITUS WITH OBESITY (MULTI): ICD-10-CM

## 2024-10-14 DIAGNOSIS — E11.69 TYPE 2 DIABETES MELLITUS WITH OBESITY (MULTI): ICD-10-CM

## 2024-10-18 RX ORDER — DULAGLUTIDE 0.75 MG/.5ML
0.75 INJECTION, SOLUTION SUBCUTANEOUS
OUTPATIENT
Start: 2024-10-20

## 2024-10-22 ENCOUNTER — PATIENT MESSAGE (OUTPATIENT)
Dept: PRIMARY CARE | Facility: CLINIC | Age: 64
End: 2024-10-22
Payer: COMMERCIAL

## 2024-11-04 ENCOUNTER — OFFICE VISIT (OUTPATIENT)
Dept: URGENT CARE | Age: 64
End: 2024-11-04
Payer: COMMERCIAL

## 2024-11-04 VITALS
OXYGEN SATURATION: 95 % | HEART RATE: 78 BPM | DIASTOLIC BLOOD PRESSURE: 70 MMHG | RESPIRATION RATE: 15 BRPM | SYSTOLIC BLOOD PRESSURE: 120 MMHG | TEMPERATURE: 98.1 F

## 2024-11-04 DIAGNOSIS — R10.84 GENERALIZED ABDOMINAL PAIN: Primary | ICD-10-CM

## 2024-11-04 DIAGNOSIS — R19.7 DIARRHEA, UNSPECIFIED TYPE: ICD-10-CM

## 2024-11-04 LAB
POC APPEARANCE, URINE: CLEAR
POC BILIRUBIN, URINE: ABNORMAL
POC BLOOD, URINE: NEGATIVE
POC COLOR, URINE: ABNORMAL
POC GLUCOSE, URINE: NEGATIVE MG/DL
POC KETONES, URINE: NEGATIVE MG/DL
POC LEUKOCYTES, URINE: NEGATIVE
POC NITRITE,URINE: NEGATIVE
POC PH, URINE: 6 PH
POC PROTEIN, URINE: NEGATIVE MG/DL
POC SPECIFIC GRAVITY, URINE: >=1.03
POC UROBILINOGEN, URINE: 0.2 EU/DL

## 2024-11-04 ASSESSMENT — ENCOUNTER SYMPTOMS
LIGHT-HEADEDNESS: 0
BLOOD IN STOOL: 0
BACK PAIN: 0
SHORTNESS OF BREATH: 0
DYSURIA: 0
CONSTIPATION: 0
NAUSEA: 0
DIARRHEA: 1
ABDOMINAL PAIN: 1
FEVER: 0
MYALGIAS: 1
CHILLS: 0
FATIGUE: 1
COUGH: 1
DIZZINESS: 0
HEADACHES: 0
CONFUSION: 0
VOMITING: 0

## 2024-11-04 NOTE — LETTER
November 4, 2024     Patient: Marina Menezes   YOB: 1960   Date of Visit: 11/4/2024       To Whom It May Concern:    Marina Menezes was seen in my clinic on 11/4/2024 at 1:50 pm. Please excuse Marina for work for 2 days, till 11/7/24  If you have any questions or concerns, please don't hesitate to call.         Sincerely,         [facility]        CC: No Recipients

## 2024-11-04 NOTE — PROGRESS NOTES
Subjective   Patient ID: Marina Menezes is a 64 y.o. female. They present today with a chief complaint of Diarrhea.    History of Present Illness  64-year-old non-smoker diabetic with abdominal pain, headache, body aches, 3 episodes of nonbilious none bloody diarrhea past 2 days.  She reports eating at the salad bar from [employer] to make diet changes.  She has been taking Tylenol for symptom relief.  She reports some sneezing and cough.  No chest pain or shortness of breath.  No nausea or vomiting.      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    Skipped heart beats    COVID-19 04/28/2020    COVID-19 virus infection    Cramp and spasm 06/21/2019    Limb cramp    Cramp and spasm 07/27/2016    Limb cramps    Disorder of pigmentation, unspecified 07/27/2016    Discoloration of skin of toe    Dorsalgia, unspecified 09/20/2017    Musculoskeletal back pain    Effusion, left knee 10/24/2018    Effusion of left knee    Encounter for other general examination 10/22/2019    Encounter for biometric screening    Encounter for other screening for malignant neoplasm of breast 04/28/2021    Screening for breast cancer    Essential (primary) hypertension 11/14/2022    Hypertension    Immunization not carried out because of patient refusal 10/28/2018    Influenza vaccination declined by patient    Morbid (severe) obesity due to excess calories (Multi) 01/07/2022    Class 2 severe obesity with serious comorbidity and body mass index (BMI) of 37.0 to 37.9 in  adult    Morbid (severe) obesity due to excess calories (Multi) 12/07/2020    Class 2 severe obesity with serious comorbidity and body mass index (BMI) of 37.0 to 37.9 in adult    Myalgia, other site 04/28/2020    Musculoskeletal pain    Obstructive sleep apnea (adult) (pediatric)     Obstructive sleep apnea, adult    Other chest pain 08/25/2022    Atypical chest pain    Other fecal abnormalities 05/21/2019    Loose stools    Personal history of diseases of the blood and blood-forming organs and certain disorders involving the immune mechanism     History of anemia    Personal history of other diseases of the circulatory system     History of cardiac arrhythmia    Personal history of other diseases of the circulatory system 10/25/2019    History of hypertension    Personal history of other diseases of the musculoskeletal system and connective tissue     History of gout    Personal history of other diseases of the respiratory system 12/20/2019    History of acute bronchitis    Personal history of other endocrine, nutritional and metabolic disease     History of hyperlipidemia    Personal history of other endocrine, nutritional and metabolic disease     History of obesity    Personal history of other endocrine, nutritional and metabolic disease 06/20/2016    History of hypokalemia    Personal history of other endocrine, nutritional and metabolic disease 04/03/2018    History of hypokalemia    Personal history of other specified conditions     History of vertigo    Personal history of other specified conditions 06/08/2020    History of dizziness    Personal history of other specified conditions 03/05/2019    History of diarrhea    Personal history of other specified conditions 06/30/2020    History of abdominal pain    Personal history of other specified conditions 07/29/2019    History of palpitations    Personal history of other specified conditions 06/20/2016    History of nausea    Personal history of urinary  (tract) infections 09/20/2017    History of urinary tract infection    Pleurodynia 12/20/2019    Rib pain on left side    Type 2 diabetes mellitus without complications (Multi) 10/25/2019    Diabetes mellitus    Unspecified symptoms and signs involving the genitourinary system     UTI symptoms       Past Surgical History:   Procedure Laterality Date    CT ANGIO CORONARY ART WITH HEARTFLOW IF SCORE >30%  3/16/2022    CT HEART CORONARY ANGIOGRAM 3/16/2022 [facility]    HYSTERECTOMY  08/23/2021    Hysterectomy    OTHER SURGICAL HISTORY  10/25/2019    Knee replacement    OTHER SURGICAL HISTORY  10/25/2019    Foot surgery        reports that she has never smoked. She has never used smokeless tobacco. She reports that she does not drink alcohol and does not use drugs.    Review of Systems  Review of Systems   Constitutional:  Positive for fatigue. Negative for chills and fever.   HENT:  Positive for sneezing.    Respiratory:  Positive for cough. Negative for shortness of breath.    Cardiovascular:  Negative for chest pain.   Gastrointestinal:  Positive for abdominal pain and diarrhea. Negative for blood in stool, constipation, nausea and vomiting.   Genitourinary:  Negative for decreased urine volume, dysuria and urgency.   Musculoskeletal:  Positive for myalgias. Negative for back pain.   Skin:  Negative for rash.   Neurological:  Negative for dizziness, light-headedness and headaches.   Psychiatric/Behavioral:  Negative for behavioral problems and confusion.                                   Objective    Vitals:    11/04/24 1429   BP: 120/70   BP Location: Left arm   Patient Position: Sitting   Pulse: 78   Resp: 15   Temp: 36.7 °C (98.1 °F)   TempSrc: Oral   SpO2: 95%     No LMP recorded (lmp unknown). Patient has had a hysterectomy.    Physical Exam  Vitals and nursing note reviewed. Exam conducted with a chaperone present.   Constitutional:       General: She is not in acute distress.     Appearance: Normal  appearance. She is not ill-appearing, toxic-appearing or diaphoretic.   HENT:      Right Ear: Tympanic membrane normal.      Left Ear: Tympanic membrane normal.      Nose: Congestion present. No rhinorrhea.      Mouth/Throat:      Mouth: Mucous membranes are moist.      Pharynx: No oropharyngeal exudate or posterior oropharyngeal erythema.   Cardiovascular:      Rate and Rhythm: Normal rate and regular rhythm.      Pulses: Normal pulses.      Heart sounds: Normal heart sounds.   Pulmonary:      Effort: Pulmonary effort is normal.      Breath sounds: Normal breath sounds.   Abdominal:      General: Bowel sounds are normal.      Palpations: Abdomen is soft. There is no mass.      Tenderness: There is abdominal tenderness (mild tender  LUQ, RLQ). There is no right CVA tenderness, left CVA tenderness, guarding or rebound.   Skin:     General: Skin is warm.      Capillary Refill: Capillary refill takes less than 2 seconds.   Neurological:      General: No focal deficit present.      Mental Status: She is alert and oriented to person, place, and time.   Psychiatric:         Mood and Affect: Mood normal.         Behavior: Behavior normal.         Procedures    Point of Care Test & Imaging Results from this visit  Results for orders placed or performed in visit on 11/04/24   POCT UA Automated manually resulted   Result Value Ref Range    POC Color, Urine Lupe (A) Straw, Yellow, Light-Yellow    POC Appearance, Urine Clear Clear    POC Glucose, Urine NEGATIVE NEGATIVE mg/dl    POC Bilirubin, Urine SMALL (1+) (A) NEGATIVE    POC Ketones, Urine NEGATIVE NEGATIVE mg/dl    POC Specific Gravity, Urine >=1.030 1.005 - 1.035    POC Blood, Urine NEGATIVE NEGATIVE    POC PH, Urine 6.0 No Reference Range Established PH    POC Protein, Urine NEGATIVE NEGATIVE, 30 (1+) mg/dl    POC Urobilinogen, Urine 0.2 0.2, 1.0 EU/DL    Poc Nitrite, Urine NEGATIVE NEGATIVE    POC Leukocytes, Urine NEGATIVE NEGATIVE      No results  found.    Diagnostic study results (if any) were reviewed by Tiffany Reyes.    Assessment/Plan   Allergies, medications, history, and pertinent labs/EKGs/Imaging reviewed by Tiffany Reyes.     Medical Decision Making  Based on history, clinical exam, abdominal pain, diarrhea advised for good hydration with fluids , work excuse, dietary modifications.  No bagged salads or salad bar until stool studies available.  If new or worsening symptoms please go to the ER.  Urine reviewed normal.  Continue Tylenol as needed for symptom control.    Orders and Diagnoses  Diagnoses and all orders for this visit:  Generalized abdominal pain  -     POCT UA Automated manually resulted  Diarrhea, unspecified type  -     Stool Pathogen Panel, PCR; Future  -     C. difficile, PCR; Future      Medical Admin Record      Patient disposition: Home    Electronically signed by Tiffany Reyes  10:46 PM

## 2024-11-04 NOTE — PATIENT INSTRUCTIONS
Based on history, clinical exam, abdominal pain, diarrhea advised for good hydration with fluids , work excuse, dietary modifications.  No bagged salads or salad bar until stool studies available.  If new or worsening symptoms please go to the ER.  Urine reviewed normal.  Continue Tylenol as needed for symptom control.

## 2024-11-19 ENCOUNTER — PATIENT MESSAGE (OUTPATIENT)
Dept: PRIMARY CARE | Facility: CLINIC | Age: 64
End: 2024-11-19
Payer: COMMERCIAL

## 2024-11-19 DIAGNOSIS — E11.69 TYPE 2 DIABETES MELLITUS WITH OBESITY (MULTI): ICD-10-CM

## 2024-11-19 DIAGNOSIS — E66.9 TYPE 2 DIABETES MELLITUS WITH OBESITY (MULTI): ICD-10-CM

## 2024-11-19 PROCEDURE — RXMED WILLOW AMBULATORY MEDICATION CHARGE

## 2024-11-19 RX ORDER — DULAGLUTIDE 4.5 MG/.5ML
4.5 INJECTION, SOLUTION SUBCUTANEOUS
Qty: 6 ML | Refills: 0 | Status: SHIPPED | OUTPATIENT
Start: 2024-11-24

## 2024-11-19 NOTE — TELEPHONE ENCOUNTER
Last seen 10/08/24  Needs a new rx for trulicity 3mg/0.5 or 4.5   please sent to  pharmacy [facility].   States she took the last pill today

## 2024-11-22 ENCOUNTER — PHARMACY VISIT (OUTPATIENT)
Dept: PHARMACY | Facility: CLINIC | Age: 64
End: 2024-11-22
Payer: MEDICARE

## 2024-12-16 PROCEDURE — RXMED WILLOW AMBULATORY MEDICATION CHARGE

## 2024-12-23 ENCOUNTER — PHARMACY VISIT (OUTPATIENT)
Dept: PHARMACY | Facility: CLINIC | Age: 64
End: 2024-12-23
Payer: MEDICARE

## 2025-01-08 DIAGNOSIS — E66.9 TYPE 2 DIABETES MELLITUS WITH OBESITY (MULTI): ICD-10-CM

## 2025-01-08 DIAGNOSIS — I10 ESSENTIAL (PRIMARY) HYPERTENSION: ICD-10-CM

## 2025-01-08 DIAGNOSIS — E11.69 TYPE 2 DIABETES MELLITUS WITH OBESITY (MULTI): ICD-10-CM

## 2025-01-12 RX ORDER — CHLORTHALIDONE 25 MG/1
25 TABLET ORAL DAILY
Qty: 90 TABLET | Refills: 3 | Status: SHIPPED | OUTPATIENT
Start: 2025-01-12

## 2025-01-12 RX ORDER — DULAGLUTIDE 4.5 MG/.5ML
4.5 INJECTION, SOLUTION SUBCUTANEOUS
Qty: 6 ML | Refills: 1 | Status: SHIPPED | OUTPATIENT
Start: 2025-01-12

## 2025-01-13 PROCEDURE — RXMED WILLOW AMBULATORY MEDICATION CHARGE

## 2025-01-18 ENCOUNTER — PHARMACY VISIT (OUTPATIENT)
Dept: PHARMACY | Facility: CLINIC | Age: 65
End: 2025-01-18
Payer: MEDICARE

## 2025-02-10 PROCEDURE — RXMED WILLOW AMBULATORY MEDICATION CHARGE

## 2025-02-18 ENCOUNTER — PHARMACY VISIT (OUTPATIENT)
Dept: PHARMACY | Facility: CLINIC | Age: 65
End: 2025-02-18
Payer: MEDICARE

## 2025-02-26 DIAGNOSIS — E11.69 TYPE 2 DIABETES MELLITUS WITH OBESITY (MULTI): ICD-10-CM

## 2025-02-26 DIAGNOSIS — E66.9 TYPE 2 DIABETES MELLITUS WITH OBESITY (MULTI): ICD-10-CM

## 2025-02-27 RX ORDER — ROSUVASTATIN CALCIUM 20 MG/1
20 TABLET, COATED ORAL DAILY
Qty: 90 TABLET | Refills: 3 | Status: SHIPPED | OUTPATIENT
Start: 2025-02-27

## 2025-03-07 ENCOUNTER — OFFICE VISIT (OUTPATIENT)
Dept: URGENT CARE | Age: 65
End: 2025-03-07
Payer: COMMERCIAL

## 2025-03-07 ENCOUNTER — APPOINTMENT (OUTPATIENT)
Dept: URGENT CARE | Age: 65
End: 2025-03-07
Payer: COMMERCIAL

## 2025-03-07 VITALS
HEART RATE: 72 BPM | SYSTOLIC BLOOD PRESSURE: 131 MMHG | OXYGEN SATURATION: 96 % | RESPIRATION RATE: 16 BRPM | TEMPERATURE: 98.1 F | DIASTOLIC BLOOD PRESSURE: 82 MMHG

## 2025-03-07 DIAGNOSIS — R14.0 BLOATING: ICD-10-CM

## 2025-03-07 DIAGNOSIS — K59.00 CONSTIPATION, UNSPECIFIED CONSTIPATION TYPE: Primary | ICD-10-CM

## 2025-03-07 DIAGNOSIS — R11.0 NAUSEA: ICD-10-CM

## 2025-03-07 DIAGNOSIS — R10.9 LEFT SIDED ABDOMINAL PAIN: ICD-10-CM

## 2025-03-07 LAB
POC APPEARANCE, URINE: CLEAR
POC BILIRUBIN, URINE: NEGATIVE
POC BLOOD, URINE: NEGATIVE
POC COLOR, URINE: YELLOW
POC GLUCOSE, URINE: NEGATIVE MG/DL
POC KETONES, URINE: NEGATIVE MG/DL
POC LEUKOCYTES, URINE: NEGATIVE
POC NITRITE,URINE: NEGATIVE
POC PH, URINE: 7 PH
POC PROTEIN, URINE: NEGATIVE MG/DL
POC SPECIFIC GRAVITY, URINE: 1.01
POC UROBILINOGEN, URINE: 0.2 EU/DL

## 2025-03-07 RX ORDER — ONDANSETRON 4 MG/1
4 TABLET, FILM COATED ORAL EVERY 8 HOURS PRN
Qty: 10 TABLET | Refills: 0 | Status: SHIPPED | OUTPATIENT
Start: 2025-03-07 | End: 2025-03-14

## 2025-03-07 RX ORDER — DICYCLOMINE HYDROCHLORIDE 20 MG/1
20 TABLET ORAL 4 TIMES DAILY PRN
Qty: 20 TABLET | Refills: 0 | Status: SHIPPED | OUTPATIENT
Start: 2025-03-07 | End: 2025-03-12

## 2025-03-07 NOTE — PROGRESS NOTES
HPI:  Patient states that in am today she was feel bloating and pain on the left side of her abdomen that was on and off for a few hours, but has resolved now.  No fever/chills.   No vomiting. +nausea.  No diarrhea. Last bm was yesterday. Pt was feeling constipated today.  No urinary symptoms.  No hx/o diverticulitis.  Pt had c-scope a year ago and it was normal, had a few benign polyps. No unusual food.  No travel.  No recent antibiotics.        ROS:  No fever/chills  No vomiting  No urinary symptoms    PE:    A&O x3  NCAT  PERRLA, EOMI  TM clear bl  No pharyngeal erythema  RRR  CTAB  Abd:soft, nd, nt,+bs   no cva tndop  MOEx4  No focal deficit  Judgement normal    Results:  UA: no blood/WBC/nitrates    A/P:   Abdominal pain  Bloating  Nausea  Constipation    Increase fluids.  Avoid spicy/fatty/acidic food. Light diet.  Miralax.  Increase fiber. Keep a diary of symptoms.  Recheck with your doctor in a few days if returns.  Go to the ER if starts getting worse.

## 2025-03-07 NOTE — LETTER
March 7, 2025     Patient: Marina Menezes   YOB: 1960   Date of Visit: 3/7/2025       To Whom It May Concern:    aMrina Menezes was seen in my clinic on 3/7/2025 at 2:40 pm. Please excuse Marina for her absence from work on this day to make the appointment. She may return to work on 03/10/2025.    If you have any questions or concerns, please don't hesitate to call.         Sincerely,         Breanna Edwards MD        CC: No Recipients

## 2025-03-17 ENCOUNTER — APPOINTMENT (OUTPATIENT)
Dept: CARDIOLOGY | Facility: HOSPITAL | Age: 65
End: 2025-03-17
Payer: COMMERCIAL

## 2025-03-17 ENCOUNTER — HOSPITAL ENCOUNTER (EMERGENCY)
Facility: HOSPITAL | Age: 65
Discharge: HOME | End: 2025-03-17
Attending: EMERGENCY MEDICINE
Payer: COMMERCIAL

## 2025-03-17 ENCOUNTER — APPOINTMENT (OUTPATIENT)
Dept: RADIOLOGY | Facility: HOSPITAL | Age: 65
End: 2025-03-17
Payer: COMMERCIAL

## 2025-03-17 VITALS
BODY MASS INDEX: 35.03 KG/M2 | HEART RATE: 77 BPM | HEIGHT: 66 IN | SYSTOLIC BLOOD PRESSURE: 148 MMHG | WEIGHT: 218 LBS | TEMPERATURE: 96.8 F | DIASTOLIC BLOOD PRESSURE: 88 MMHG | OXYGEN SATURATION: 98 % | RESPIRATION RATE: 16 BRPM

## 2025-03-17 DIAGNOSIS — N39.0 URINARY TRACT INFECTION WITHOUT HEMATURIA, SITE UNSPECIFIED: ICD-10-CM

## 2025-03-17 DIAGNOSIS — R10.9 ABDOMINAL PAIN, UNSPECIFIED ABDOMINAL LOCATION: Primary | ICD-10-CM

## 2025-03-17 LAB
ALBUMIN SERPL BCP-MCNC: 4.3 G/DL (ref 3.4–5)
ALP SERPL-CCNC: 93 U/L (ref 33–136)
ALT SERPL W P-5'-P-CCNC: 34 U/L (ref 7–45)
ANION GAP SERPL CALC-SCNC: 13 MMOL/L (ref 10–20)
APPEARANCE UR: CLEAR
AST SERPL W P-5'-P-CCNC: 37 U/L (ref 9–39)
BACTERIA #/AREA URNS AUTO: ABNORMAL /HPF
BASOPHILS # BLD AUTO: 0.04 X10*3/UL (ref 0–0.1)
BASOPHILS NFR BLD AUTO: 0.7 %
BILIRUB SERPL-MCNC: 0.4 MG/DL (ref 0–1.2)
BILIRUB UR STRIP.AUTO-MCNC: NEGATIVE MG/DL
BUN SERPL-MCNC: 13 MG/DL (ref 6–23)
CALCIUM SERPL-MCNC: 9.6 MG/DL (ref 8.6–10.3)
CARDIAC TROPONIN I PNL SERPL HS: 5 NG/L (ref 0–13)
CARDIAC TROPONIN I PNL SERPL HS: 5 NG/L (ref 0–13)
CHLORIDE SERPL-SCNC: 96 MMOL/L (ref 98–107)
CO2 SERPL-SCNC: 31 MMOL/L (ref 21–32)
COLOR UR: YELLOW
CREAT SERPL-MCNC: 0.74 MG/DL (ref 0.5–1.05)
EGFRCR SERPLBLD CKD-EPI 2021: 89 ML/MIN/1.73M*2
EOSINOPHIL # BLD AUTO: 0.06 X10*3/UL (ref 0–0.7)
EOSINOPHIL NFR BLD AUTO: 1.1 %
ERYTHROCYTE [DISTWIDTH] IN BLOOD BY AUTOMATED COUNT: 14.3 % (ref 11.5–14.5)
GLUCOSE SERPL-MCNC: 172 MG/DL (ref 74–99)
GLUCOSE UR STRIP.AUTO-MCNC: NORMAL MG/DL
HCT VFR BLD AUTO: 45.2 % (ref 36–46)
HGB BLD-MCNC: 15 G/DL (ref 12–16)
IMM GRANULOCYTES # BLD AUTO: 0.01 X10*3/UL (ref 0–0.7)
IMM GRANULOCYTES NFR BLD AUTO: 0.2 % (ref 0–0.9)
KETONES UR STRIP.AUTO-MCNC: NEGATIVE MG/DL
LEUKOCYTE ESTERASE UR QL STRIP.AUTO: ABNORMAL
LIPASE SERPL-CCNC: 37 U/L (ref 9–82)
LYMPHOCYTES # BLD AUTO: 2.67 X10*3/UL (ref 1.2–4.8)
LYMPHOCYTES NFR BLD AUTO: 48.5 %
MCH RBC QN AUTO: 28 PG (ref 26–34)
MCHC RBC AUTO-ENTMCNC: 33.2 G/DL (ref 32–36)
MCV RBC AUTO: 85 FL (ref 80–100)
MONOCYTES # BLD AUTO: 0.98 X10*3/UL (ref 0.1–1)
MONOCYTES NFR BLD AUTO: 17.8 %
MUCOUS THREADS #/AREA URNS AUTO: ABNORMAL /LPF
NEUTROPHILS # BLD AUTO: 1.75 X10*3/UL (ref 1.2–7.7)
NEUTROPHILS NFR BLD AUTO: 31.7 %
NITRITE UR QL STRIP.AUTO: NEGATIVE
NRBC BLD-RTO: 0 /100 WBCS (ref 0–0)
PH UR STRIP.AUTO: 7 [PH]
PLATELET # BLD AUTO: 289 X10*3/UL (ref 150–450)
POTASSIUM SERPL-SCNC: 3 MMOL/L (ref 3.5–5.3)
PROT SERPL-MCNC: 8.7 G/DL (ref 6.4–8.2)
PROT UR STRIP.AUTO-MCNC: ABNORMAL MG/DL
RBC # BLD AUTO: 5.35 X10*6/UL (ref 4–5.2)
RBC # UR STRIP.AUTO: NEGATIVE MG/DL
RBC #/AREA URNS AUTO: ABNORMAL /HPF
SODIUM SERPL-SCNC: 137 MMOL/L (ref 136–145)
SP GR UR STRIP.AUTO: 1.03
UROBILINOGEN UR STRIP.AUTO-MCNC: ABNORMAL MG/DL
WBC # BLD AUTO: 5.5 X10*3/UL (ref 4.4–11.3)
WBC #/AREA URNS AUTO: ABNORMAL /HPF

## 2025-03-17 PROCEDURE — 2500000004 HC RX 250 GENERAL PHARMACY W/ HCPCS (ALT 636 FOR OP/ED): Performed by: EMERGENCY MEDICINE

## 2025-03-17 PROCEDURE — 2500000002 HC RX 250 W HCPCS SELF ADMINISTERED DRUGS (ALT 637 FOR MEDICARE OP, ALT 636 FOR OP/ED): Performed by: EMERGENCY MEDICINE

## 2025-03-17 PROCEDURE — 2500000001 HC RX 250 WO HCPCS SELF ADMINISTERED DRUGS (ALT 637 FOR MEDICARE OP): Performed by: EMERGENCY MEDICINE

## 2025-03-17 PROCEDURE — 96374 THER/PROPH/DIAG INJ IV PUSH: CPT

## 2025-03-17 PROCEDURE — 83690 ASSAY OF LIPASE: CPT | Performed by: EMERGENCY MEDICINE

## 2025-03-17 PROCEDURE — 84484 ASSAY OF TROPONIN QUANT: CPT | Performed by: EMERGENCY MEDICINE

## 2025-03-17 PROCEDURE — 36415 COLL VENOUS BLD VENIPUNCTURE: CPT | Performed by: EMERGENCY MEDICINE

## 2025-03-17 PROCEDURE — 74176 CT ABD & PELVIS W/O CONTRAST: CPT

## 2025-03-17 PROCEDURE — 99285 EMERGENCY DEPT VISIT HI MDM: CPT | Mod: 25 | Performed by: EMERGENCY MEDICINE

## 2025-03-17 PROCEDURE — 87086 URINE CULTURE/COLONY COUNT: CPT | Mod: AHULAB | Performed by: EMERGENCY MEDICINE

## 2025-03-17 PROCEDURE — 93005 ELECTROCARDIOGRAM TRACING: CPT

## 2025-03-17 PROCEDURE — 96375 TX/PRO/DX INJ NEW DRUG ADDON: CPT

## 2025-03-17 PROCEDURE — 80053 COMPREHEN METABOLIC PANEL: CPT | Performed by: EMERGENCY MEDICINE

## 2025-03-17 PROCEDURE — 85025 COMPLETE CBC W/AUTO DIFF WBC: CPT | Performed by: EMERGENCY MEDICINE

## 2025-03-17 PROCEDURE — 96361 HYDRATE IV INFUSION ADD-ON: CPT

## 2025-03-17 PROCEDURE — 81001 URINALYSIS AUTO W/SCOPE: CPT | Performed by: EMERGENCY MEDICINE

## 2025-03-17 PROCEDURE — 74176 CT ABD & PELVIS W/O CONTRAST: CPT | Performed by: RADIOLOGY

## 2025-03-17 RX ORDER — POTASSIUM CHLORIDE 20 MEQ/1
60 TABLET, EXTENDED RELEASE ORAL ONCE
Status: COMPLETED | OUTPATIENT
Start: 2025-03-17 | End: 2025-03-17

## 2025-03-17 RX ORDER — FAMOTIDINE 20 MG/1
20 TABLET, FILM COATED ORAL 2 TIMES DAILY
Qty: 30 TABLET | Refills: 0 | Status: SHIPPED | OUTPATIENT
Start: 2025-03-17 | End: 2025-04-01

## 2025-03-17 RX ORDER — ONDANSETRON HYDROCHLORIDE 2 MG/ML
4 INJECTION, SOLUTION INTRAVENOUS EVERY 30 MIN PRN
Status: DISCONTINUED | OUTPATIENT
Start: 2025-03-17 | End: 2025-03-18 | Stop reason: HOSPADM

## 2025-03-17 RX ORDER — CEPHALEXIN 500 MG/1
500 CAPSULE ORAL 4 TIMES DAILY
Qty: 20 CAPSULE | Refills: 0 | Status: SHIPPED | OUTPATIENT
Start: 2025-03-17 | End: 2025-03-21 | Stop reason: WASHOUT

## 2025-03-17 RX ORDER — ALUMINUM HYDROXIDE, MAGNESIUM HYDROXIDE, AND SIMETHICONE 1200; 120; 1200 MG/30ML; MG/30ML; MG/30ML
30 SUSPENSION ORAL ONCE
Status: COMPLETED | OUTPATIENT
Start: 2025-03-17 | End: 2025-03-17

## 2025-03-17 RX ORDER — FAMOTIDINE 10 MG/ML
20 INJECTION, SOLUTION INTRAVENOUS ONCE
Status: COMPLETED | OUTPATIENT
Start: 2025-03-17 | End: 2025-03-17

## 2025-03-17 RX ADMIN — SODIUM CHLORIDE 1000 ML: 0.9 INJECTION, SOLUTION INTRAVENOUS at 20:11

## 2025-03-17 RX ADMIN — ONDANSETRON 4 MG: 2 INJECTION, SOLUTION INTRAMUSCULAR; INTRAVENOUS at 20:07

## 2025-03-17 RX ADMIN — POTASSIUM CHLORIDE 60 MEQ: 1500 TABLET, EXTENDED RELEASE ORAL at 20:07

## 2025-03-17 RX ADMIN — FAMOTIDINE 20 MG: 10 INJECTION INTRAVENOUS at 20:07

## 2025-03-17 RX ADMIN — ALUMINUM HYDROXIDE, MAGNESIUM HYDROXIDE, AND DIMETHICONE 30 ML: 200; 20; 200 SUSPENSION ORAL at 20:07

## 2025-03-17 ASSESSMENT — COLUMBIA-SUICIDE SEVERITY RATING SCALE - C-SSRS
1. IN THE PAST MONTH, HAVE YOU WISHED YOU WERE DEAD OR WISHED YOU COULD GO TO SLEEP AND NOT WAKE UP?: NO
2. HAVE YOU ACTUALLY HAD ANY THOUGHTS OF KILLING YOURSELF?: NO
6. HAVE YOU EVER DONE ANYTHING, STARTED TO DO ANYTHING, OR PREPARED TO DO ANYTHING TO END YOUR LIFE?: NO

## 2025-03-17 ASSESSMENT — PAIN SCALES - GENERAL
PAINLEVEL_OUTOF10: 0 - NO PAIN
PAINLEVEL_OUTOF10: 2
PAINLEVEL_OUTOF10: 7

## 2025-03-17 ASSESSMENT — PAIN DESCRIPTION - PAIN TYPE: TYPE: ACUTE PAIN

## 2025-03-17 ASSESSMENT — PAIN - FUNCTIONAL ASSESSMENT: PAIN_FUNCTIONAL_ASSESSMENT: 0-10

## 2025-03-17 ASSESSMENT — PAIN DESCRIPTION - LOCATION: LOCATION: ABDOMEN

## 2025-03-17 NOTE — ED TRIAGE NOTES
Patient has stomach pain, patient denies any significant GI history, patient feels weak, denies urinary frequency. patient has stomach pain started Saturday, patient is diabetic. Patient had bowel movement this morning.

## 2025-03-17 NOTE — Clinical Note
Marina Menezes was seen and treated in our emergency department on 3/17/2025.  She may return to work on 03/20/2025.       If you have any questions or concerns, please don't hesitate to call.      Lonnie Sanchez MD

## 2025-03-17 NOTE — ED PROVIDER NOTES
History/Exam limitations: none.   Additional history was obtained from patient.          HPI:    Marina Menezes is a 65 y.o. female PMH hypertension, hyperlipidemia, diabetes presenting for evaluation of diffuse upper abdominal discomfort.  Patient states she is getting intermittent burning type sensation in her upper abdomen.  She states it has been going on for the last 4 days approximately.  States that she has some nausea with it.  States that sometimes it happens after food but no clear trigger.  No radiation of pain.  States that she is getting some loose stools as well.  States that she was given a prescription for something from an urgent care primary care but did not pick it up.  No headache and vision changes, fever, chest pain, shortness of breath, diarrhea, dysuria.        Physical Exam:  ED Triage Vitals [03/17/25 1747]   Temperature Heart Rate Respirations BP   36 °C (96.8 °F) 80 18 153/89      Pulse Ox Temp src Heart Rate Source Patient Position   98 % -- -- --      BP Location FiO2 (%)     -- --        GEN:      Alert, NAD  Eyes:       PERRL, EOMI  HENT:      NC/AT, OP clear, airway patent, MM  CV:      RRR, no MRG, no LE pitting edema, 2+ radial and pedal pulses  PULM:     CTAB, no w/r/r, easy WOB, symmetric chest rise  ABD:      Soft, NT, ND, no masses, BS +  :       No CVA TTP  NEURO:   A&Ox3, no focal deficits    MSK:      FROM, no joint deformities or swelling, no e/o trauma  SKIN:       Warm and dry  PSYCH:    Appropriate mood and affect         MDM/ED Course:   Marina Menezes is a 65 y.o. female PMH hypertension, hyperlipidemia, diabetes presenting for evaluation of diffuse upper abdominal discomfort.  Vitals reassuring.  Exam as documented above.  Differential for abdominal pain includes but is not limited to SBO, incarcerated hernia, pancreatitis, viscous organ rupture, mesenteric ischemia, aortic pathology, biliary pathology, diverticulitis, appendicitis, UTI/pyelonephritis, nephrolithiasis,  viral illness.  Differential includes gastritis.  Differential includes atypical ACS.  Troponin negative x 2 unlikely ACS.  EKG as below.  IV placed and labs drawn.  Mildly hypokalemic to 3.0, otherwise chemistry and liver enzymes reassuring.  Lipase negative, no tenderness, likely pancreatitis.  CBC reassuring.  CT abdomen pelvis ordered by triage provider and no acute process.  Patient was given potassium repletion.  Was also given Pepcid, GI cocktail and IV fluids.  Patient felt improved.  Urinalysis concerning for possible UTI with Logiq esterase white cells and 1+ bacteria.  Plan was made to treat UTI with Keflex, initiate Pepcid.  Patient feels comfortable discharge and is tolerating p.o.  GI referral placed.  Explained findings, exam/study results, the importance of follow up and the plan moving forward; patient and/or caregiver verbalized understanding and agreement. All questions were answered and no new questions at this time. Patient will be discharged with strict return precautions, follow up plan with PCP/GI.      EKG reviewed by me: 6:40 PM normal sinus rhythm, rate 77, normal axis, normal close, no STEMI, no ectopy, similar to prior EKGs.     Diagnoses as of 03/19/25 1427   Abdominal pain, unspecified abdominal location   Urinary tract infection without hematuria, site unspecified         Chronic medical conditions affecting care listed in MDM. I independently reviewed imaging studies and agreed with radiology reads. I reviewed recent and relevant outside records including PCP notes, Prior discharge summaries, and prior radiology reports.    Procedure  Procedures    Diagnosis:   1.  Epigastric pain  2.  UTI    Dispo: Discharged in stable condition      Disclaimer: Portions of this note were dictated by speech recognition. An attempt at proof reading was made to minimize errors. Minor errors in transcription may be present.  Please call if questions.     Lonnie Sanchez MD  03/19/25 5323

## 2025-03-17 NOTE — ED TRIAGE NOTES
TRIAGE NOTE   I saw the patient as the Clinician in Triage and performed a brief history and physical exam, established acuity, and ordered appropriate tests to develop basic plan of care. Patient will be seen by an [facility], resident and/or physician who will independently evaluate the patient. Please see subsequent provider notes for further details and disposition.     Brief HPI: In brief, Marina Menezes is a 65 y.o. female that presents for abdominal discomfort.  55-year-old female, presents with an odd feeling in the epigastric region.  She states this started perhaps 1 week ago.  Waxing and waning symptomatology in the epigastric area nonradiating.  Nauseous feeling but no emesis.  No hematemesis melena or hematochezia.  No diarrhea.  No urgency dysuria frequency.  No lower abdominal discomfort.  No chest pain per se all in the epigastric region.  No pulmonary symptoms such as shortness of breath cough or sputum.  No other upper respiratory symptoms noted.  She does admit to alcohol use on Saturday around the time this started.    Focused Physical exam:   Slightly elevated BMI.  Vital signs benign.  Afebrile.  Heart is regular.  Lungs to me were clear.  Abdomen very minimal any reproducible discomfort in the epigastric region.  No Dang sign is noted.  No lower abdominal tenderness.  No distention.  Normal bowel sounds are noted.  Plan/MDM:   Abdominal pain evaluation in addition to EKG and troponin to rule out atypical cardiac issues.  Evaluate for pancreatitis gastritis cholelithiasis cholecystitis UTI.  Evaluate for possibility of diverticulitis though unlikely or less likely given upper abdominal location       Please see subsequent provider note for further details and disposition.

## 2025-03-18 LAB
ATRIAL RATE: 77 BPM
P AXIS: -7 DEGREES
P OFFSET: 181 MS
P ONSET: 121 MS
PR INTERVAL: 194 MS
Q ONSET: 218 MS
QRS COUNT: 13 BEATS
QRS DURATION: 80 MS
QT INTERVAL: 392 MS
QTC CALCULATION(BAZETT): 443 MS
QTC FREDERICIA: 425 MS
R AXIS: -9 DEGREES
T AXIS: -14 DEGREES
T OFFSET: 414 MS
VENTRICULAR RATE: 77 BPM

## 2025-03-18 PROCEDURE — RXMED WILLOW AMBULATORY MEDICATION CHARGE

## 2025-03-18 NOTE — DISCHARGE INSTRUCTIONS
You were seen in the emergency for evaluation of intermittent upper abdominal pain.  Your labs imaging are reassuring.  Your urinalysis was concerning for possible UTI, please take antibiotics as prescribed.  Please start Pepcid as your symptoms may be related to acid in your stomach.  Please return to the emergency room if you have worsening symptoms or if you have any other concerns.

## 2025-03-19 ENCOUNTER — PHARMACY VISIT (OUTPATIENT)
Dept: PHARMACY | Facility: CLINIC | Age: 65
End: 2025-03-19
Payer: MEDICARE

## 2025-03-19 LAB — BACTERIA UR CULT: NORMAL

## 2025-03-21 ENCOUNTER — PATIENT MESSAGE (OUTPATIENT)
Dept: PRIMARY CARE | Facility: CLINIC | Age: 65
End: 2025-03-21

## 2025-03-21 ENCOUNTER — OFFICE VISIT (OUTPATIENT)
Dept: PRIMARY CARE | Facility: CLINIC | Age: 65
End: 2025-03-21
Payer: COMMERCIAL

## 2025-03-21 VITALS
HEIGHT: 66 IN | WEIGHT: 229 LBS | OXYGEN SATURATION: 96 % | HEART RATE: 75 BPM | RESPIRATION RATE: 14 BRPM | DIASTOLIC BLOOD PRESSURE: 70 MMHG | TEMPERATURE: 97 F | BODY MASS INDEX: 36.8 KG/M2 | SYSTOLIC BLOOD PRESSURE: 116 MMHG

## 2025-03-21 DIAGNOSIS — E66.01 CLASS 2 SEVERE OBESITY DUE TO EXCESS CALORIES WITH SERIOUS COMORBIDITY AND BODY MASS INDEX (BMI) OF 36.0 TO 36.9 IN ADULT: ICD-10-CM

## 2025-03-21 DIAGNOSIS — I10 ESSENTIAL HYPERTENSION: ICD-10-CM

## 2025-03-21 DIAGNOSIS — E66.9 TYPE 2 DIABETES MELLITUS WITH OBESITY (MULTI): ICD-10-CM

## 2025-03-21 DIAGNOSIS — K21.9 GASTROESOPHAGEAL REFLUX DISEASE, UNSPECIFIED WHETHER ESOPHAGITIS PRESENT: ICD-10-CM

## 2025-03-21 DIAGNOSIS — E66.812 CLASS 2 SEVERE OBESITY DUE TO EXCESS CALORIES WITH SERIOUS COMORBIDITY AND BODY MASS INDEX (BMI) OF 36.0 TO 36.9 IN ADULT: ICD-10-CM

## 2025-03-21 DIAGNOSIS — Z12.31 SCREENING MAMMOGRAM FOR BREAST CANCER: ICD-10-CM

## 2025-03-21 DIAGNOSIS — I70.0 ATHEROSCLEROSIS OF AORTA (CMS-HCC): ICD-10-CM

## 2025-03-21 DIAGNOSIS — K29.70 GASTRITIS WITHOUT BLEEDING, UNSPECIFIED CHRONICITY, UNSPECIFIED GASTRITIS TYPE: ICD-10-CM

## 2025-03-21 DIAGNOSIS — Z00.00 WELLNESS EXAMINATION: Primary | ICD-10-CM

## 2025-03-21 DIAGNOSIS — I10 ESSENTIAL (PRIMARY) HYPERTENSION: ICD-10-CM

## 2025-03-21 DIAGNOSIS — E11.69 TYPE 2 DIABETES MELLITUS WITH OBESITY (MULTI): ICD-10-CM

## 2025-03-21 DIAGNOSIS — E78.5 HYPERLIPIDEMIA, UNSPECIFIED HYPERLIPIDEMIA TYPE: ICD-10-CM

## 2025-03-21 PROCEDURE — 1160F RVW MEDS BY RX/DR IN RCRD: CPT | Performed by: FAMILY MEDICINE

## 2025-03-21 PROCEDURE — 1036F TOBACCO NON-USER: CPT | Performed by: FAMILY MEDICINE

## 2025-03-21 PROCEDURE — 3074F SYST BP LT 130 MM HG: CPT | Performed by: FAMILY MEDICINE

## 2025-03-21 PROCEDURE — 3008F BODY MASS INDEX DOCD: CPT | Performed by: FAMILY MEDICINE

## 2025-03-21 PROCEDURE — 1159F MED LIST DOCD IN RCRD: CPT | Performed by: FAMILY MEDICINE

## 2025-03-21 PROCEDURE — 99397 PER PM REEVAL EST PAT 65+ YR: CPT | Performed by: FAMILY MEDICINE

## 2025-03-21 PROCEDURE — 99214 OFFICE O/P EST MOD 30 MIN: CPT | Performed by: FAMILY MEDICINE

## 2025-03-21 PROCEDURE — 3078F DIAST BP <80 MM HG: CPT | Performed by: FAMILY MEDICINE

## 2025-03-21 RX ORDER — METFORMIN HYDROCHLORIDE 500 MG/1
1000 TABLET, EXTENDED RELEASE ORAL
Qty: 180 TABLET | Refills: 3 | Status: SHIPPED | OUTPATIENT
Start: 2025-03-21

## 2025-03-21 RX ORDER — OMEPRAZOLE 40 MG/1
40 CAPSULE, DELAYED RELEASE ORAL
Qty: 90 CAPSULE | Refills: 0 | Status: SHIPPED | OUTPATIENT
Start: 2025-03-21 | End: 2025-06-19

## 2025-03-21 RX ORDER — CHLORTHALIDONE 25 MG/1
25 TABLET ORAL DAILY
Qty: 90 TABLET | Refills: 3 | Status: SHIPPED | OUTPATIENT
Start: 2025-03-21

## 2025-03-21 ASSESSMENT — PROMIS GLOBAL HEALTH SCALE
EMOTIONAL_PROBLEMS: SOMETIMES
RATE_AVERAGE_PAIN: 1
RATE_AVERAGE_FATIGUE: MILD
RATE_GENERAL_HEALTH: GOOD
CARRYOUT_PHYSICAL_ACTIVITIES: COMPLETELY
RATE_SOCIAL_SATISFACTION: GOOD
RATE_PHYSICAL_HEALTH: FAIR
CARRYOUT_SOCIAL_ACTIVITIES: GOOD
RATE_QUALITY_OF_LIFE: VERY GOOD
RATE_MENTAL_HEALTH: GOOD

## 2025-03-21 ASSESSMENT — PATIENT HEALTH QUESTIONNAIRE - PHQ9
1. LITTLE INTEREST OR PLEASURE IN DOING THINGS: NOT AT ALL
SUM OF ALL RESPONSES TO PHQ9 QUESTIONS 1 AND 2: 0
2. FEELING DOWN, DEPRESSED OR HOPELESS: NOT AT ALL

## 2025-03-21 NOTE — PROGRESS NOTES
"Subjective   Patient ID: Marina Menezes is a 65 y.o. female who presents for Annual Exam.    HPI     Here for a physical  Does not want a chaperone.       Health Maintenance:  -   Colonoscopy: 9/14/23- repeat in 5 yr  -   Mammogram: 8/2/24- ordered for 1 yr  -   PAP: na  -   Bone density DEXA: 10/18/23  -   Hep C screening;completed  Immunizations:  - COVID, RSV, pneumonia 20, Flu,shingrix      Other concerns/issues/followup:  1 - Dm  Using metformin IR- causing Gi upset  Lab Results   Component Value Date    HGBA1C 6.4 (H) 10/08/2024    HGBA1C 6.4 (H) 03/19/2024    HGBA1C 6.5 (A) 07/18/2023       2 -  HTN  BP at goal today in office.  Using medications without issues.  Denies CP, SOB, palpitations, change in vision, dizziness, N/V.    3 -  was with muscle cramps  Was with low potassium     4- abd pain- \"always\"  Worsened last week  Dxd with uti- but culture came back neg  Was given pepcid- help some    PMSFH was reviewed & updated.     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   HENT:      Head: Normocephalic.      Right Ear: External ear normal.      Left Ear: External ear normal.      Nose: Nose normal.      Mouth/Throat:      Pharynx: Oropharynx is clear.   Eyes:      Extraocular Movements: Extraocular movements intact.   Neck:      Thyroid: No thyroid mass, thyromegaly or thyroid tenderness.      Vascular: No carotid bruit.   Cardiovascular:      Rate and Rhythm: Normal rate and regular rhythm.      Heart sounds: Normal heart sounds. No " murmur heard.  Pulmonary:      Effort: Pulmonary effort is normal. No respiratory distress.      Breath sounds: Normal breath sounds. No wheezing.   Abdominal:      General: Bowel sounds are normal.      Palpations: Abdomen is soft. There is no mass.      Tenderness: There is no abdominal tenderness.   Musculoskeletal:         General: Normal range of motion.      Cervical back: Normal range of motion.      Right lower leg: No edema.      Left lower leg: No edema.   Skin:     General: Skin is warm and dry.      Findings: No rash.   Neurological:      General: No focal deficit present.      Mental Status: She is alert and oriented to person, place, and time.      Motor: No weakness.   Psychiatric:         Mood and Affect: Mood normal.         Behavior: Behavior normal.         Assessment/Plan   Assessment & Plan  Wellness examination         Essential (primary) hypertension    Orders:    chlorthalidone (Hygroton) 25 mg tablet; Take 1 tablet (25 mg) by mouth once daily.    Comprehensive Metabolic Panel; Future    Atherosclerosis of aorta (CMS-HCC)    Orders:    Lipid Panel; Future    Type 2 diabetes mellitus with obesity (Multi)  Hba1c at goal  Checking again  Continue meds    - let me know when you are out of Trulicity- we can try to switch to another GLP1 for better wt loss      - labs      - changed the metformin to XR  - continue with eye appt yearly    Orders:    metFORMIN XR (Glucophage-XR) 500 mg 24 hr tablet; Take 2 tablets (1,000 mg) by mouth once daily with breakfast. Do not crush, chew, or split.    Comprehensive Metabolic Panel; Future    Hemoglobin A1C; Future    TSH with reflex to Free T4 if abnormal; Future    Lipid Panel; Future    Albumin-Creatinine Ratio, Urine Random; Future    Referral to Nutrition Services; Future    Essential hypertension    Orders:    Comprehensive Metabolic Panel; Future    Hyperlipidemia, unspecified hyperlipidemia type    Orders:    Lipid Panel; Future    Screening mammogram  for breast cancer    Orders:    BI mammo bilateral screening tomosynthesis; Future    Class 2 severe obesity due to excess calories with serious comorbidity and body mass index (BMI) of 36.0 to 36.9 in adult    Orders:    Referral to Nutrition Services; Future    Gastritis without bleeding, unspecified chronicity, unspecified gastritis type    Orders:    omeprazole (PriLOSEC) 40 mg DR capsule; Take 1 capsule (40 mg) by mouth once daily in the morning. Take before meals. Do not crush or chew.    H. Pylori Breath Test; Future    Gastroesophageal reflux disease, unspecified whether esophagitis present    Orders:    H. Pylori Breath Test; Future      Please follow up in   - 6month for DM/HTN  - 1 yr welcome to medicare   - or as needed.

## 2025-03-21 NOTE — ASSESSMENT & PLAN NOTE
Hba1c at goal  Checking again  Continue meds    - let me know when you are out of Trulicity- we can try to switch to another GLP1 for better wt loss      - labs      - changed the metformin to XR  - continue with eye appt yearly    Orders:    metFORMIN XR (Glucophage-XR) 500 mg 24 hr tablet; Take 2 tablets (1,000 mg) by mouth once daily with breakfast. Do not crush, chew, or split.    Comprehensive Metabolic Panel; Future    Hemoglobin A1C; Future    TSH with reflex to Free T4 if abnormal; Future    Lipid Panel; Future    Albumin-Creatinine Ratio, Urine Random; Future    Referral to Nutrition Services; Future

## 2025-03-21 NOTE — PATIENT INSTRUCTIONS
Health Maintenance:  -   Colonoscopy: 9/14/23- repeat in 5 yr  -   Mammogram: 8/2/24- ordered for 1 yr- callfor appt  -   PAP: na  -   Bone density DEXA: 10/18/23  -   Hep C screening;completed  Immunizations:  - COVID, RSV, pneumonia 20, Flu, shingrix- declined    Dm  - let me know when you are out of Trulicity- we can try to switch to another GLP1 for better wt loss      - labs      - changed the metformin to XR      - continue with eye appt yearly    HTN  - Your blood pressure is at goal today- goal is less than 130/80  - Continue your current medications  - Weight loss can help lower your bp!  Work on a healthy whole food diet and add at least 30min of exercise 5 days per week  - Work on a low salt diet    Abd pain  - hold the abx  - ok to continue the pepcid until done  - add omeprazole daily [id number]  - ordered breath test        Continue to work on healthy diet and exercise  We encourage all patients to engage in exercise 150 minutes per week   Adults 18 and older, activity during each week - if you are able:    Engage in at least 150 minutes of moderate or vigorous exercise per week   If you are able- Engage in muscle strengthening activity on 2 or more days per week        Please follow up in   - 6month for DM/HTN  - 1 yr welcome to medicare   - or as needed.       ** If labs or imaging ordered at today's visit, all the non-urgent results will be discussed at your next visit    If you have been referred for a special test or to a specialist please call  [phone] to schedule an appointment.  If you have any further questions, or if develop new or worsened symptoms, please give our office a call at [phone].

## 2025-03-21 NOTE — LETTER
March 21, 2025     Patient: Marina Menezes   YOB: 1960   Date of Visit: 3/21/2025       To Whom It May Concern:    Marina Menezes was seen in my clinic on 3/21/2025 at 8:20 am. Please excuse Marina for her absence from work on this day to make the appointment.    If you have any questions or concerns, please don't hesitate to call.         Sincerely,           Sia Menezes, DO

## 2025-03-22 LAB
ALBUMIN SERPL-MCNC: 4.7 G/DL (ref 3.6–5.1)
ALBUMIN/CREAT UR: NORMAL
ALP SERPL-CCNC: 88 U/L (ref 37–153)
ALT SERPL-CCNC: 31 U/L (ref 6–29)
ANION GAP SERPL CALCULATED.4IONS-SCNC: 8 MMOL/L (CALC) (ref 7–17)
AST SERPL-CCNC: 30 U/L (ref 10–35)
BILIRUB SERPL-MCNC: 0.4 MG/DL (ref 0.2–1.2)
BUN SERPL-MCNC: 14 MG/DL (ref 7–25)
CALCIUM SERPL-MCNC: 9.9 MG/DL (ref 8.6–10.4)
CHLORIDE SERPL-SCNC: 98 MMOL/L (ref 98–110)
CHOLEST SERPL-MCNC: 104 MG/DL
CHOLEST/HDLC SERPL: 2.3 (CALC)
CO2 SERPL-SCNC: 33 MMOL/L (ref 20–32)
CREAT SERPL-MCNC: 0.65 MG/DL (ref 0.5–1.05)
CREAT UR-MCNC: NORMAL MG/DL
EGFRCR SERPLBLD CKD-EPI 2021: 97 ML/MIN/1.73M2
EST. AVERAGE GLUCOSE BLD GHB EST-MCNC: 148 MG/DL
EST. AVERAGE GLUCOSE BLD GHB EST-SCNC: 8.2 MMOL/L
GLUCOSE SERPL-MCNC: 103 MG/DL (ref 65–99)
HBA1C MFR BLD: 6.8 % OF TOTAL HGB
HDLC SERPL-MCNC: 45 MG/DL
LDLC SERPL CALC-MCNC: 45 MG/DL (CALC)
MICROALBUMIN UR-MCNC: NORMAL
NONHDLC SERPL-MCNC: 59 MG/DL (CALC)
POTASSIUM SERPL-SCNC: 4.2 MMOL/L (ref 3.5–5.3)
PROT SERPL-MCNC: 8 G/DL (ref 6.1–8.1)
SODIUM SERPL-SCNC: 139 MMOL/L (ref 135–146)
TRIGL SERPL-MCNC: 62 MG/DL
TSH SERPL-ACNC: 0.45 MIU/L (ref 0.4–4.5)

## 2025-03-24 LAB
ALBUMIN SERPL-MCNC: 4.7 G/DL (ref 3.6–5.1)
ALBUMIN/CREAT UR: 3 MG/G CREAT
ALP SERPL-CCNC: 88 U/L (ref 37–153)
ALT SERPL-CCNC: 31 U/L (ref 6–29)
ANION GAP SERPL CALCULATED.4IONS-SCNC: 8 MMOL/L (CALC) (ref 7–17)
AST SERPL-CCNC: 30 U/L (ref 10–35)
BILIRUB SERPL-MCNC: 0.4 MG/DL (ref 0.2–1.2)
BUN SERPL-MCNC: 14 MG/DL (ref 7–25)
CALCIUM SERPL-MCNC: 9.9 MG/DL (ref 8.6–10.4)
CHLORIDE SERPL-SCNC: 98 MMOL/L (ref 98–110)
CHOLEST SERPL-MCNC: 104 MG/DL
CHOLEST/HDLC SERPL: 2.3 (CALC)
CO2 SERPL-SCNC: 33 MMOL/L (ref 20–32)
CREAT SERPL-MCNC: 0.65 MG/DL (ref 0.5–1.05)
CREAT UR-MCNC: 219 MG/DL (ref 20–275)
EGFRCR SERPLBLD CKD-EPI 2021: 97 ML/MIN/1.73M2
EST. AVERAGE GLUCOSE BLD GHB EST-MCNC: 148 MG/DL
EST. AVERAGE GLUCOSE BLD GHB EST-SCNC: 8.2 MMOL/L
GLUCOSE SERPL-MCNC: 103 MG/DL (ref 65–99)
HBA1C MFR BLD: 6.8 % OF TOTAL HGB
HDLC SERPL-MCNC: 45 MG/DL
LDLC SERPL CALC-MCNC: 45 MG/DL (CALC)
MICROALBUMIN UR-MCNC: 0.6 MG/DL
NONHDLC SERPL-MCNC: 59 MG/DL (CALC)
POTASSIUM SERPL-SCNC: 4.2 MMOL/L (ref 3.5–5.3)
PROT SERPL-MCNC: 8 G/DL (ref 6.1–8.1)
SODIUM SERPL-SCNC: 139 MMOL/L (ref 135–146)
TRIGL SERPL-MCNC: 62 MG/DL
TSH SERPL-ACNC: 0.45 MIU/L (ref 0.4–4.5)

## 2025-04-07 ENCOUNTER — APPOINTMENT (OUTPATIENT)
Dept: RADIOLOGY | Facility: HOSPITAL | Age: 65
End: 2025-04-07
Payer: COMMERCIAL

## 2025-04-07 ENCOUNTER — PATIENT MESSAGE (OUTPATIENT)
Dept: PRIMARY CARE | Facility: CLINIC | Age: 65
End: 2025-04-07
Payer: COMMERCIAL

## 2025-04-07 ENCOUNTER — CLINICAL SUPPORT (OUTPATIENT)
Dept: EMERGENCY MEDICINE | Facility: HOSPITAL | Age: 65
End: 2025-04-07
Payer: COMMERCIAL

## 2025-04-07 ENCOUNTER — HOSPITAL ENCOUNTER (EMERGENCY)
Facility: HOSPITAL | Age: 65
Discharge: HOME | End: 2025-04-07
Attending: STUDENT IN AN ORGANIZED HEALTH CARE EDUCATION/TRAINING PROGRAM
Payer: COMMERCIAL

## 2025-04-07 VITALS
RESPIRATION RATE: 16 BRPM | SYSTOLIC BLOOD PRESSURE: 127 MMHG | WEIGHT: 216 LBS | HEIGHT: 66 IN | DIASTOLIC BLOOD PRESSURE: 71 MMHG | TEMPERATURE: 97.1 F | BODY MASS INDEX: 34.72 KG/M2 | OXYGEN SATURATION: 97 % | HEART RATE: 70 BPM

## 2025-04-07 DIAGNOSIS — I10 ESSENTIAL (PRIMARY) HYPERTENSION: Primary | ICD-10-CM

## 2025-04-07 DIAGNOSIS — E87.6 HYPOKALEMIA: ICD-10-CM

## 2025-04-07 DIAGNOSIS — R42 DIZZINESS: Primary | ICD-10-CM

## 2025-04-07 LAB
ALBUMIN SERPL BCP-MCNC: 3.8 G/DL (ref 3.4–5)
ALP SERPL-CCNC: 73 U/L (ref 33–136)
ALT SERPL W P-5'-P-CCNC: 14 U/L (ref 7–45)
ANION GAP SERPL CALC-SCNC: 12 MMOL/L
AST SERPL W P-5'-P-CCNC: 19 U/L (ref 9–39)
BASOPHILS # BLD AUTO: 0.04 X10*3/UL (ref 0–0.1)
BASOPHILS NFR BLD AUTO: 0.7 %
BILIRUB SERPL-MCNC: 0.3 MG/DL (ref 0–1.2)
BUN SERPL-MCNC: 18 MG/DL (ref 6–23)
CALCIUM SERPL-MCNC: 9.2 MG/DL (ref 8.6–10.6)
CARDIAC TROPONIN I PNL SERPL HS: 3 NG/L (ref 0–34)
CARDIAC TROPONIN I PNL SERPL HS: 3 NG/L (ref 0–34)
CHLORIDE SERPL-SCNC: 98 MMOL/L (ref 98–107)
CO2 SERPL-SCNC: 33 MMOL/L (ref 21–32)
CREAT SERPL-MCNC: 0.74 MG/DL (ref 0.5–1.05)
EGFRCR SERPLBLD CKD-EPI 2021: 89 ML/MIN/1.73M*2
EOSINOPHIL # BLD AUTO: 0.13 X10*3/UL (ref 0–0.7)
EOSINOPHIL NFR BLD AUTO: 2.2 %
ERYTHROCYTE [DISTWIDTH] IN BLOOD BY AUTOMATED COUNT: 13.7 % (ref 11.5–14.5)
GLUCOSE BLD MANUAL STRIP-MCNC: 141 MG/DL (ref 74–99)
GLUCOSE SERPL-MCNC: 130 MG/DL (ref 74–99)
HCT VFR BLD AUTO: 33.7 % (ref 36–46)
HGB BLD-MCNC: 12 G/DL (ref 12–16)
IMM GRANULOCYTES # BLD AUTO: 0.01 X10*3/UL (ref 0–0.7)
IMM GRANULOCYTES NFR BLD AUTO: 0.2 % (ref 0–0.9)
LYMPHOCYTES # BLD AUTO: 2.18 X10*3/UL (ref 1.2–4.8)
LYMPHOCYTES NFR BLD AUTO: 36.8 %
MAGNESIUM SERPL-MCNC: 1.84 MG/DL (ref 1.6–2.4)
MCH RBC QN AUTO: 29.3 PG (ref 26–34)
MCHC RBC AUTO-ENTMCNC: 35.6 G/DL (ref 32–36)
MCV RBC AUTO: 82 FL (ref 80–100)
MONOCYTES # BLD AUTO: 0.53 X10*3/UL (ref 0.1–1)
MONOCYTES NFR BLD AUTO: 9 %
NEUTROPHILS # BLD AUTO: 3.03 X10*3/UL (ref 1.2–7.7)
NEUTROPHILS NFR BLD AUTO: 51.1 %
NRBC BLD-RTO: 0 /100 WBCS (ref 0–0)
PLATELET # BLD AUTO: 255 X10*3/UL (ref 150–450)
POTASSIUM SERPL-SCNC: 2.9 MMOL/L (ref 3.5–5.3)
POTASSIUM SERPL-SCNC: 3.4 MMOL/L (ref 3.5–5.3)
PROT SERPL-MCNC: 7 G/DL (ref 6.4–8.2)
RBC # BLD AUTO: 4.1 X10*6/UL (ref 4–5.2)
SODIUM SERPL-SCNC: 140 MMOL/L (ref 136–145)
WBC # BLD AUTO: 5.9 X10*3/UL (ref 4.4–11.3)

## 2025-04-07 PROCEDURE — 71045 X-RAY EXAM CHEST 1 VIEW: CPT

## 2025-04-07 PROCEDURE — 85025 COMPLETE CBC W/AUTO DIFF WBC: CPT

## 2025-04-07 PROCEDURE — 84484 ASSAY OF TROPONIN QUANT: CPT

## 2025-04-07 PROCEDURE — 96366 THER/PROPH/DIAG IV INF ADDON: CPT

## 2025-04-07 PROCEDURE — 2500000001 HC RX 250 WO HCPCS SELF ADMINISTERED DRUGS (ALT 637 FOR MEDICARE OP)

## 2025-04-07 PROCEDURE — 71045 X-RAY EXAM CHEST 1 VIEW: CPT | Performed by: RADIOLOGY

## 2025-04-07 PROCEDURE — 99285 EMERGENCY DEPT VISIT HI MDM: CPT | Mod: 25 | Performed by: STUDENT IN AN ORGANIZED HEALTH CARE EDUCATION/TRAINING PROGRAM

## 2025-04-07 PROCEDURE — 96365 THER/PROPH/DIAG IV INF INIT: CPT

## 2025-04-07 PROCEDURE — 36415 COLL VENOUS BLD VENIPUNCTURE: CPT

## 2025-04-07 PROCEDURE — 83735 ASSAY OF MAGNESIUM: CPT

## 2025-04-07 PROCEDURE — 84075 ASSAY ALKALINE PHOSPHATASE: CPT

## 2025-04-07 PROCEDURE — 93005 ELECTROCARDIOGRAM TRACING: CPT

## 2025-04-07 PROCEDURE — 82947 ASSAY GLUCOSE BLOOD QUANT: CPT

## 2025-04-07 PROCEDURE — 84132 ASSAY OF SERUM POTASSIUM: CPT | Mod: 59

## 2025-04-07 PROCEDURE — 2500000004 HC RX 250 GENERAL PHARMACY W/ HCPCS (ALT 636 FOR OP/ED)

## 2025-04-07 RX ORDER — POTASSIUM CHLORIDE 1.5 G/1.58G
20 POWDER, FOR SOLUTION ORAL 2 TIMES DAILY
Qty: 6 PACKET | Refills: 0 | Status: SHIPPED | OUTPATIENT
Start: 2025-04-07 | End: 2025-04-10

## 2025-04-07 RX ORDER — POTASSIUM CHLORIDE 14.9 MG/ML
20 INJECTION INTRAVENOUS ONCE
Status: COMPLETED | OUTPATIENT
Start: 2025-04-07 | End: 2025-04-07

## 2025-04-07 RX ORDER — POTASSIUM CHLORIDE 1.5 G/1.58G
20 POWDER, FOR SOLUTION ORAL 2 TIMES DAILY
Qty: 28 PACKET | Refills: 0 | Status: SHIPPED | OUTPATIENT
Start: 2025-04-07 | End: 2025-04-07

## 2025-04-07 RX ORDER — POTASSIUM CHLORIDE 1.5 G/1.58G
40 POWDER, FOR SOLUTION ORAL ONCE
Status: COMPLETED | OUTPATIENT
Start: 2025-04-07 | End: 2025-04-07

## 2025-04-07 RX ADMIN — POTASSIUM CHLORIDE 40 MEQ: 1.5 POWDER, FOR SOLUTION ORAL at 10:22

## 2025-04-07 RX ADMIN — POTASSIUM CHLORIDE 20 MEQ: 14.9 INJECTION, SOLUTION INTRAVENOUS at 10:23

## 2025-04-07 ASSESSMENT — LIFESTYLE VARIABLES
HAVE PEOPLE ANNOYED YOU BY CRITICIZING YOUR DRINKING: NO
EVER FELT BAD OR GUILTY ABOUT YOUR DRINKING: NO
HAVE YOU EVER FELT YOU SHOULD CUT DOWN ON YOUR DRINKING: NO
TOTAL SCORE: 0
EVER HAD A DRINK FIRST THING IN THE MORNING TO STEADY YOUR NERVES TO GET RID OF A HANGOVER: NO

## 2025-04-07 ASSESSMENT — PAIN - FUNCTIONAL ASSESSMENT
PAIN_FUNCTIONAL_ASSESSMENT: 0-10
PAIN_FUNCTIONAL_ASSESSMENT: 0-10

## 2025-04-07 ASSESSMENT — PAIN SCALES - GENERAL
PAINLEVEL_OUTOF10: 0 - NO PAIN
PAINLEVEL_OUTOF10: 0 - NO PAIN

## 2025-04-07 NOTE — TELEPHONE ENCOUNTER
Sherrie kellogg my Potassium was low today i went to the E.R. I need Klor-Con Powder 20 Priyanka single dose package orange flavor please and thanks They gave me 6 packages

## 2025-04-07 NOTE — ED TRIAGE NOTES
Pt is a 66yo female presenting via EMS>reports pt to have had dizziness while walking down her stairs, and itchy ears. Pt did not fall, -LOC, symptoms resolved quickly. EMS educated pt on benefits on getting seen. Pt states she was a little dizzy, endorsing no visual changes, HA, Chest pain, gait disturbance. Pt is AO4 upon admission, VSS.

## 2025-04-07 NOTE — DISCHARGE INSTRUCTIONS
You were seen for an episode of dizziness that had resolved prior to arrival here, your labs did show hypokalemia, we have sent you with a short prescription for potassium supplementation, this may be secondary to your chlorthalidone use and you should follow-up with your primary care for repeat labs within the next week.  Please return to the emergency department for any emergent concerns or acute change in symptoms.

## 2025-04-07 NOTE — ED PROVIDER NOTES
"History of Present Illness     History provided by: Patient  Limitations to History: None    HPI:  Marina Menezes is a 64 y/o female presenting via EMS with dizziness with a PMH significant for DM, HTN, HLD, and muscle cramps iso low potassium. Patient reports that she took a shower this morning after waking up and felt a bit \"off\" while she was in the shower. She was then walking down the stairs when she felt some shortness of breath and dizziness, which she described as feeling \"off balance\". Patient reports she has never felt this way before, although the shortness of breath was similar to what she felt during an anxiety attack several years ago. Patient did not fall or lose consciousness. Patient denies any associated visual changes, headache, tinnitus, weakness, chest pain, abdominal pain, nausea/vomiting, or swelling in her arms or legs. She does not endorse any dizziness or shortness of breath now in the ED. Patient denies any recent illnesses but does endorse some \"itchiness\" in her ears, particularly the left, but has not noticed any drainage. Patient does not use tobacco or other drugs and occasional alcohol use (had 1 drink last night). She has not yet had anything to eat or drink today. Patient denies any recent changes in medications, aside from her metformin which was switched from regular to extended release by her PCP at the end of March.     Physical Exam   Triage vitals:  T 36.2 °C (97.1 °F)  HR 67  /67  RR 16  O2 98 % None (Room air)    General: Awake, alert, in no acute distress, non-toxic appearing  Eyes: Gaze conjugate.  No scleral icterus or injection, pupils equal and reactive, no nystagmus  HENT: Normo-cephalic, atraumatic. No stridor. External auditory canals without erythema or drainage.  TM's normal in appearance bilaterally without erythema, or bulging  CV: Regular rate, irregular rhythm. No MRG. Cap refill less than 2 seconds, radial pulse 2+ bilaterally  Resp: Breathing " non-labored, clear to auscultation bilaterally, no accessory muscle use.  GI: Soft, non-distended, non-tender. No rebound or guarding.  MSK/Extremities: No gross bony deformities. Moving all extremities, no lower extremity edema  Skin: Warm. Appropriate color  Neuro: Awake and Alert.  Oriented x 3, GCS 15.  Face symmetric. Appropriate tone. Moving all extremities equally.  No drift, strength 5 out of 5 bilateral upper and lower extremities, sensation grossly intact.  Ambulates with steady gait.    Medical Decision Making & ED Course   Medical Decision Makin y.o. female presenting to the ED for evaluation of dizziness.  Episodic pattern onset during shower, currently asymptomatic.  Has no additional symptoms, had short.  Of shortness of breath during the episode.  Patient ANO x 3, GCS 15, vital stable.  Nonfocal neurologic exam, ambulates with steady, no abnormal nystagmus, I have low suspicion for central etiology of dizziness, patient has no sudden onset headache, visual disturbances, no indication for CT head imaging.  I have low suspicion for TIA/CVA.  Considered cardiac etiology of symptoms will get ECG and cardiac markers although I have low suspicion for ACS.  Patient has no abnormal murmur, no lower extremity edema, no focal lung findings.  Will plan to also get basic labs to rule out electrolyte abnormalities, anemia or hypoglycemia.  Send endorsing some left ear itching however has no signs of otitis media or externa, TMs intact unremarkable.  Patient recently treated for UTI, has no urinary symptoms today with low suspicion that this is etiology of symptoms.  Chest x-ray showing no focal lung findings.  Patient remains asymptomatic at this time, she was notably hypokalemic on labs, has history of hypokalemia and takes oral potassium supplementation but has not been taking as prescribed because the tabs are too large, she otherwise has no nausea vomiting or extensive GI losses, she was given oral and  IV supplementation and will plan for recheck, will plan to discharge with course of oral potassium supplementation and very close PCP follow-up.  Given that patient remained asymptomatic throughout her ED course she is appropriate for discharge with close outpatient follow-up, strict return precautions discussed.  ----      Differential diagnoses considered include but are not limited to: Arrhythmia, ischemia, electrolyte derangements, central vertigo, peripheral vertigo     Social Determinants of Health which Significantly Impact Care: None identified     EKG Independent Interpretation: See ED course for my independent interpretation if ECG was obtained.    Independent Result Review and Interpretation: Please see MDM and ED course for my independent interpretation of the results    Chronic conditions affecting the patient's care: Please see H&P and MDM    The patient was discussed with the following consultants/services: None    Care Considerations: As document above in [facility]    ED Course:  ED Course as of 04/07/25 1446   Mon Apr 07, 2025   0806 ECG 12 lead  Sinus rhythm first-degree AV block frequent PVCs, no acute ST segment changes.  , QRS 74, QTc 429. ECG without evidence of  QTc prolongation), Brugada pattern, delta waves, LVH or RV strain.    [KR]   0806 POCT Glucose(!): 141 [KR]   0928 XR chest 1 view  No acute cardiopulmonary process on independent review of imaging. [KR]   0940 Troponin I, High Sensitivity (CMC): 3 [KR]   1004 Troponin I, High Sensitivity (CMC): 3 [KR]   1006 POTASSIUM(!!): 2.9  Hx of hypokalemia, will tx with IV and oral supplementation, patient is supposed to be on supplementation at home. Is on chlorthalidone which could be contributing.  [KR]      ED Course User Index  [KR] Miley Maurer DO         Diagnoses as of 04/07/25 1446   Dizziness   Hypokalemia     Disposition   As a result of the work-up, the patient was discharged home.  she was informed of her diagnosis and instructed  to come back with any concerns or worsening of condition.  she and was agreeable to the plan as discussed above.  she was given the opportunity to ask questions.  All of the patient's questions were answered.    Procedures   Procedures    Patient seen and discussed with attending physician    Miley Maurer DO  Emergency Medicine     Miley Maurer DO  Resident  04/07/25 [id number]

## 2025-04-08 LAB
ATRIAL RATE: 70 BPM
P AXIS: 74 DEGREES
P OFFSET: 167 MS
P ONSET: 102 MS
PR INTERVAL: 234 MS
Q ONSET: 219 MS
QRS COUNT: 11 BEATS
QRS DURATION: 74 MS
QT INTERVAL: 398 MS
QTC CALCULATION(BAZETT): 429 MS
QTC FREDERICIA: 419 MS
R AXIS: 50 DEGREES
T AXIS: 60 DEGREES
T OFFSET: 418 MS
VENTRICULAR RATE: 70 BPM

## 2025-04-09 RX ORDER — BLOOD SUGAR DIAGNOSTIC
STRIP MISCELLANEOUS
Qty: 100 STRIP | Refills: 3 | Status: SHIPPED | OUTPATIENT
Start: 2025-04-09

## 2025-04-09 RX ORDER — LANCETS
EACH MISCELLANEOUS
Qty: 100 EACH | Refills: 3 | Status: SHIPPED | OUTPATIENT
Start: 2025-04-09

## 2025-04-09 RX ORDER — POTASSIUM CHLORIDE 1.5 G/1.58G
20 POWDER, FOR SOLUTION ORAL 2 TIMES DAILY
Qty: 6 PACKET | Refills: 0 | OUTPATIENT
Start: 2025-04-09 | End: 2025-04-12

## 2025-04-11 ENCOUNTER — APPOINTMENT (OUTPATIENT)
Dept: PRIMARY CARE | Facility: CLINIC | Age: 65
End: 2025-04-11
Payer: COMMERCIAL

## 2025-04-15 ENCOUNTER — PATIENT MESSAGE (OUTPATIENT)
Dept: PRIMARY CARE | Facility: CLINIC | Age: 65
End: 2025-04-15
Payer: COMMERCIAL

## 2025-04-15 ENCOUNTER — TELEPHONE (OUTPATIENT)
Dept: PRIMARY CARE | Facility: CLINIC | Age: 65
End: 2025-04-15
Payer: COMMERCIAL

## 2025-04-15 DIAGNOSIS — E11.69 TYPE 2 DIABETES MELLITUS WITH OBESITY (MULTI): ICD-10-CM

## 2025-04-15 DIAGNOSIS — E66.9 TYPE 2 DIABETES MELLITUS WITH OBESITY (MULTI): ICD-10-CM

## 2025-04-15 LAB — POTASSIUM SERPL-SCNC: 3.5 MMOL/L (ref 3.5–5.3)

## 2025-04-15 PROCEDURE — RXMED WILLOW AMBULATORY MEDICATION CHARGE

## 2025-04-15 RX ORDER — DULAGLUTIDE 4.5 MG/.5ML
4.5 INJECTION, SOLUTION SUBCUTANEOUS
Qty: 6 ML | Refills: 1 | Status: SHIPPED | OUTPATIENT
Start: 2025-04-15

## 2025-04-21 ENCOUNTER — PHARMACY VISIT (OUTPATIENT)
Dept: PHARMACY | Facility: CLINIC | Age: 65
End: 2025-04-21
Payer: MEDICARE

## 2025-04-22 NOTE — PROGRESS NOTES
Nutrition Initial Assessment:     Patient Marina Menezes is a 65 y.o. female being seen at [facility] who was referred by     Sia Menezes DO    on 3/21/2025 for   1. Type 2 diabetes mellitus with obesity (Multi)  Referral to Nutrition Services      2. Class 2 severe obesity due to excess calories with serious comorbidity and body mass index (BMI) of 36.0 to 36.9 in adult  Referral to Nutrition Services          Nutrition Assessment    Problem List[1]      Nutrition History:  Food & Nutrition History: Pt reports trying to lose weight and having a difficult time. Pt reports she is also Type 2 DM. Looking for a healthy diet for both blood sugar control and weight loss.   Food Allergies: None;  Food Intolerances: None  Vitamin/mineral intake: None  Herbal supplements: None  Medication and Complementary/Alternative Medicine Use: none  GI Symptoms: GI Symptoms : None  Mouth Issues: Oral Problems: denies; Teeth Issues: Dentition : own  Sleep Habits: 5-6 hours disrupted, 7+ hours disrupted    Diet Recall: wake-up 6 am  Meal 1: 8 am: boiled eggs with toast or banana   Snack:   Meal 2: 1130-12: salad with piece of fish with orange   Snack: peanuts   Meal 3: 6pm: grilled cheese with french fries, chili, chicken with mashed potatoes   Snack: applesauce, apple   Food Variety: none  Oral Nutrition Supplement Use: None   Fluid Intake: water, zero sugar pepsi, ginger ale, regular pepsi   Energy Intake: Good (>/= 75% EEN)      Food Preparation:  Cooking: Patient  Grocery Shopping: Patient  Dining Out: 1 to 3 times a month    Physical Activity:   Mostly sedentary. Going to start water aerobics with the weather changing. Pt reports she likes to walk    Food Security/Insecurity: none    Anthropometrics:                            Weight History:   Daily Weight  04/07/25 : 98 kg (216 lb)  03/21/25 : 104 kg (229 lb)  03/17/25 : 98.9 kg (218 lb)  10/08/24 : 106 kg (233 lb 8 oz)  08/02/24 : 99.8 kg (220 lb)  03/19/24 : 103  kg (226 lb 12.8 oz)  12/04/23 : 104 kg (229 lb 15 oz)  10/23/23 : 104 kg (230 lb)  07/18/23 : 104 kg (230 lb 4.8 oz)  11/14/22 : 106 kg (233 lb 2 oz)    Weight Change %:       Nutrition Focused Physical Exam Findings:  Subcutaneous Fat Loss:   Defer Subcutaneous Fat Loss Assessment: Defer all  Defer All Reason: Visually appears well nourished with no signs of noticeable wasting    Muscle Wasting:  Defer Muscle Wasting Assessment: Defer all  Defer All Reason: Visually appears well nourished with no signs of noticeable wasting    Physical Findings:  Hair: Negative  Eyes: Negative  Nails: Negative  Skin: Negative  Respiratory: Negative  Edema: none      Nutrition Significant Labs:  Last Chem:     Chemistry    Lab Results   Component Value Date/Time     04/07/2025 0750     03/21/2025 0940    K 3.5 04/14/2025 1551    CL 98 04/07/2025 0750    CL 98 03/21/2025 0940    CO2 33 (H) 04/07/2025 0750    CO2 33 (H) 03/21/2025 0940    BUN 18 04/07/2025 0750    BUN 14 03/21/2025 0940    CREATININE 0.74 04/07/2025 0750    CREATININE 0.65 03/21/2025 0940    Lab Results   Component Value Date/Time    CALCIUM 9.2 04/07/2025 0750    CALCIUM 9.9 03/21/2025 0940    ALKPHOS 73 04/07/2025 0750    ALKPHOS 88 03/21/2025 0940    AST 19 04/07/2025 0750    AST 30 03/21/2025 0940    ALT 14 04/07/2025 0750    ALT 31 (H) 03/21/2025 0940    BILITOT 0.3 04/07/2025 0750    BILITOT 0.4 03/21/2025 0940       , CMP Trend:    Recent Labs     04/14/25  1551 04/07/25  1426 04/07/25  0750 03/21/25  0940 03/17/25  1804   GLUCOSE  --   --  130* 103* 172*   NA  --   --  140 139 137   K 3.5 3.4* 2.9* 4.2 3.0*   CL  --   --  98 98 96*   CO2  --   --  33* 33* 31   ANIONGAP  --   --  12 8 13   BUN  --   --  18 14 13   CREATININE  --   --  0.74 0.65 0.74   EGFR  --   --  89 97 89   CALCIUM  --   --  9.2 9.9 9.6   ALBUMIN  --   --  3.8 4.7 4.3   ALKPHOS  --   --  73 88 93   PROT  --   --  7.0 8.0 8.7*   AST  --   --  19 30 37   BILITOT  --   --  0.3 0.4  0.4   ALT  --   --  14 31* 34   , CBC Trend:   Recent Labs     04/07/25  0750 03/17/25  1804 01/21/24  1354   WBC 5.9 5.5 6.9   NRBC 0.0 0.0 0.0   RBC 4.10 5.35* 4.59   HGB 12.0 15.0 13.1   HCT 33.7* 45.2 37.4   MCV 82 85 82   MCH 29.3 28.0 28.5   MCHC 35.6 33.2 35.0   RDW 13.7 14.3 14.9*    289 269   , RFP + Serum Mag Trend:   Recent Labs     04/14/25  1551 04/07/25  1426 04/07/25  0750 03/21/25  0940 03/17/25  1804 10/22/19  1101 06/27/19  1047   GLUCOSE  --   --  130* 103* 172*   < > 132*   NA  --   --  140 139 137   < > 136   K 3.5 3.4* 2.9* 4.2 3.0*   < > 4.6   CL  --   --  98 98 96*   < > 101   CO2  --   --  33* 33* 31   < > 28   ANIONGAP  --   --  12 8 13   < > 12   BUN  --   --  18 14 13   < > 19   CREATININE  --   --  0.74 0.65 0.74   < > 0.69   EGFR  --   --  89 97 89   < >  --    CALCIUM  --   --  9.2 9.9 9.6   < > 9.6   ALBUMIN  --   --  3.8 4.7 4.3   < > 4.0   MG  --   --  1.84  --   --   --  2.30    < > = values in this interval not displayed.   , LFT Trend:   Recent Labs     04/07/25 0750 03/21/25  0940 03/17/25  1804 06/27/19  1047 06/21/19  1204   ALBUMIN 3.8 4.7 4.3   < > 4.6   BILITOT 0.3 0.4 0.4   < > 0.6   BILIDIR  --   --   --   --  0.1   ALKPHOS 73 88 93   < > 120   ALT 14 31* 34   < > 21   AST 19 30 37   < > 17   PROT 7.0 8.0 8.7*   < > 8.2    < > = values in this interval not displayed.   , DM Specific Labs Trend (Includes HgbA1C, antibodies & fasting insulin):   Recent Labs     03/21/25  0940 10/08/24  0855 03/19/24  1008   HGBA1C 6.8* 6.4* 6.4*   , Lipid Panel Trend:    Recent Labs     03/21/25  0940 10/08/24  0855 07/18/23  0828 10/11/21  1010 04/12/21  1052   CHOL 104 100 102   < > 157   HDL 45* 53.1 50.2   < > 53.4   LDLCALC 45 34  --   --   --    LDLF  --   --  39  --  81   VLDL  --  13 13  --  22   TRIG 62 64 63  --  111    < > = values in this interval not displayed.   , Iron Panel + Serum Ferritin Trend:   Recent Labs     07/18/23  1227   IRON 88   TIBC 410   IRONSAT 21*   ,  "Vitamin B12:   Lab Results   Component Value Date    QULGNNNK70 588 07/18/2023    , Folate: No results found for: \"FOLATE\" , Vitamin D:   Lab Results   Component Value Date    VITD25 56 07/18/2023    , and CRP Trend (last 3): No results found for: \"HSCRP\"     Medications:  Current Outpatient Medications   Medication Instructions    acetaminophen (Tylenol) 325 mg tablet Every 4 hours PRN    blood sugar diagnostic (OneTouch Ultra Test) Use once daily to check sugar    cholecalciferol (Vitamin D-3) 50 MCG (2000 UT) tablet 1 tablet, Daily    famotidine (PEPCID) 20 mg, oral, 2 times daily    lancets (OneTouch UltraSoft Lancets) misc Use once daily to check sugar    metFORMIN XR (GLUCOPHAGE-XR) 1,000 mg, oral, Daily with breakfast, Do not crush, chew, or split.    multivitamin capsule Take by mouth.    omeprazole (PRILOSEC) 40 mg, oral, Daily before breakfast, Do not crush or chew.    rosuvastatin (CRESTOR) 20 mg, oral, Daily    spironolactone (ALDACTONE) 50 mg, oral, Daily    Trulicity 4.5 mg, subcutaneous, Once Weekly        Estimated Needs:  We did not discuss estimated needs today            Nutrition Diagnosis   Malnutrition Diagnosis  Patient has Malnutrition Diagnosis: No    Nutrition Diagnosis  Patient has Nutrition Diagnosis: Yes  Diagnosis Status (1): New  Nutrition Diagnosis 1: Food and nutrition related knowledge deficit  Related to (1): limited or lack of prior nutrition-related education  As Evidenced by (1): per pt report looking for guidance on healthy eating patterns, intake of unbalanced meals and snacks per diet recall       Nutrition Interventions/Recommendations   Nutrition Prescription: Oral nutrition General Healthy diet with focus on CHO control for DM management    Nutrition Interventions:   Food and Nutrient Delivery:   General Healthy diet with focus on CHO control for DM management     Coordination of Care:   none    Nutrition Education:   Nutrition Education Content: Content related nutrition " education  Balanced meals using plate method, timing of meals, staying hydrated with water or other low calorie beverages, benefits from exercise, reviewed basics of what is a CHO and portion sizes of CHO food for meal planning, reviewed normal glucose metabolism and what happens to glucose metabolism with DM    Nutrition Education Topics Discussed:   Provided Keys for a Healthy Lifestyle Handout. Discussed the following:  Start a daily exercise routine. Adults should target 150 minutes of moderate intensity exercise each week. Start slow and work your way up to this amount. Provided examples of aerobic, resistance, and stretching/balance activities.  Plan balanced meals. The “Plate Method” is a tool used to plan balanced meals. Aim for the following:  One-third to half the plate (or the meal) should be a non-starchy vegetable. Non-starchy vegetables include broccoli, cauliflower, salad greens, carrots, cucumbers, asparagus, green beans, tomatoes, and many more.  One-third to a quarter of the plate should be a lean protein. Lean proteins include chicken, turkey, fish, lean beef, eggs, nuts, tofu.  One third to a quarter of the plate can be a complex starch or carbohydrate. Examples of complex starches / carbohydrates include starchy vegetables (potatoes, peas, corn, and butternut squash), grains (whole wheat bread, quinoa, and brown rice), legumes (lentils and beans), and fruit.  Olive oil should be the primary fat source used for cooking.  Use a 9-10 inch plate to help manage portions  Pre-plan meal ideas. Spending time planning upfront saves you from relying on convenience foods. It can also help you save money! Your plan does not need to be rigid, but you should have some idea of what meals you are going to make going into the week. Place this information on the refrigerator in plain sight. There are many products you can purchase to help keep you organized.   Stay hydrated with water or other lower calorie  "beverages. We often confuse our body's signal for thirst with being hungry. Make sure you are staying hydrated, preferably with water. The best indicator of hydration is your urine. It should be a pale yellow. Provided examples of sugar-free beverages and ways to flavor water.   Pay attention to your body's hunger and fullness cues. Introduced patient to using a scale of 1-10 to rate hunger / satiety. Reviewed signs of hunger that patient might or might not feel, and encouraged being attentive to these signals if they are present. Encouraged patient to eat when feeling a \"3-4\" on the scale instead of waiting for hunger to become more severe. Encouraged patient to aim to stop eating when feeling at a \"6\" (satisfied, but could eat a little more) or a \"7\" (full but not uncomfortable). Recommended eating slowly and checking in with body to determine when body is really full. Discussed that it takes the brain around 10-15 minutes after eating to feel full. Encouraged using this method in conjunction with balanced foods on the plate using the Plate Method.    Discussed importance of consistent meal pattern   Discussed how eating consistently and balanced meals help improve satiety, boot metabolism and helps to improve blood glucose levels   Encouraged paitient to eat first meal of the day within 1-2hrs after waking up to help boost metabolism   Encouraged meals and snacks to be  throughout the day with no more than a 3-4hr gap in between to help boost metabolism   Reviewed diabetic diet including carbohydrate-containing food, appropriate carbs per meal as well as appropriate portion sizes. We discussed that 1 serving of a carbohydrate is equal to 15 gram and patient should consume no more than 3-4 servings per meal for weight maintenance or 2-3 per meal for weight loss. Education materials were provided and meal and snack options discussed. Aim for A1c <7%   Educational Handouts Provided: Keys for a Healthy " Lifestyle Handout, ADA Plate Method, [facility] Balanced Breakfast, High Protein Meal Ideas, High Protein Snack Ideas, and [facility] Carbohydrate Counting     Nutrition Counseling:   Nutrition Counseling Strategies : Nutrition counseling based on motivational interviewing strategy, Nutrition counseling based on goal setting strategy    Patient Goals:  To start eating breakfast everyday-incorporating premier protein shake for some mornings.     Readiness to Change : Good  Level of Understanding : Good  Anticipated Compliant : Good         Nutrition Monitoring and Evaluation   Food and Nutrient Intake  Monitoring and Evaluation Plan: Meal/snack pattern  Meal/Snack Pattern: Estimated meal and snack pattern  Criteria: Monitor patient's progress towards meal and snack goal(s)         Anthropometric measurements  Monitoring and Evaluation Plan: Weight  Criteria: Monitor patient's progress towards intentional weight loss of 0.5-2 lb per week, trending toward a clinically significant weight loss of 5-10% of current body weight.    Biochemical Data, Medical Tests and Procedures  Monitoring and Evaluation Plan: Glucose/endocrine profile  Glucose/Endocrine Profile: Hemoglobin A1c (HgbA1c)  Criteria: <7%       \         Follow Up: 6-8 weeks               [1]   Patient Active Problem List  Diagnosis    Allergic rhinitis    Arthritis of foot, right    Arthritis of hand, right, degenerative    Arthritis of right shoulder region    Atherosclerosis of aorta (CMS-HCC)    Calcaneal spur of right foot    Cervical pain    Cervical radiculopathy    Chronic knee pain after total replacement of right knee joint    Cubital tunnel syndrome on left    Type 2 diabetes mellitus with obesity (Multi)    Essential hypertension    Goiter    History of claustrophobia    History of total knee replacement    Hyperlipidemia    Left knee pain    Neuritis of right foot    Non-toxic nodular goiter    Class 2 severe obesity due to excess calories with serious comorbidity  and body mass index (BMI) of 36.0 to 36.9 in adult    MERVAT (obstructive sleep apnea)    Arthrofibrosis of knee joint, left    Osteoarthritis of left knee    Pain of right hand    Postmenopausal atrophic vaginitis    Right shoulder pain    Scapular dyskinesis    Shoulder impingement, left    Shoulder impingement, right    Sinus tarsi syndrome of right foot    Vitamin D deficiency    Benign neoplasm of sigmoid colon    Diverticulosis of large intestine    History of cardiac arrhythmia    Residual hemorrhoidal skin tags

## 2025-04-23 ENCOUNTER — NUTRITION (OUTPATIENT)
Dept: NUTRITION | Facility: HOSPITAL | Age: 65
End: 2025-04-23
Payer: COMMERCIAL

## 2025-04-23 DIAGNOSIS — E66.812 CLASS 2 SEVERE OBESITY DUE TO EXCESS CALORIES WITH SERIOUS COMORBIDITY AND BODY MASS INDEX (BMI) OF 36.0 TO 36.9 IN ADULT: ICD-10-CM

## 2025-04-23 DIAGNOSIS — E11.69 TYPE 2 DIABETES MELLITUS WITH OBESITY (MULTI): Primary | ICD-10-CM

## 2025-04-23 DIAGNOSIS — E66.9 TYPE 2 DIABETES MELLITUS WITH OBESITY (MULTI): Primary | ICD-10-CM

## 2025-04-23 DIAGNOSIS — E66.01 CLASS 2 SEVERE OBESITY DUE TO EXCESS CALORIES WITH SERIOUS COMORBIDITY AND BODY MASS INDEX (BMI) OF 36.0 TO 36.9 IN ADULT: ICD-10-CM

## 2025-04-23 PROCEDURE — 97802 MEDICAL NUTRITION INDIV IN: CPT

## 2025-04-28 ENCOUNTER — HOSPITAL ENCOUNTER (EMERGENCY)
Facility: HOSPITAL | Age: 65
Discharge: HOME | End: 2025-04-28
Attending: INTERNAL MEDICINE
Payer: COMMERCIAL

## 2025-04-28 ENCOUNTER — APPOINTMENT (OUTPATIENT)
Dept: CARDIOLOGY | Facility: HOSPITAL | Age: 65
End: 2025-04-28
Payer: COMMERCIAL

## 2025-04-28 ENCOUNTER — TELEPHONE (OUTPATIENT)
Dept: PRIMARY CARE | Facility: CLINIC | Age: 65
End: 2025-04-28

## 2025-04-28 VITALS
TEMPERATURE: 98.2 F | BODY MASS INDEX: 34.87 KG/M2 | HEIGHT: 66 IN | HEART RATE: 70 BPM | RESPIRATION RATE: 16 BRPM | SYSTOLIC BLOOD PRESSURE: 150 MMHG | OXYGEN SATURATION: 99 % | DIASTOLIC BLOOD PRESSURE: 91 MMHG | WEIGHT: 217 LBS

## 2025-04-28 DIAGNOSIS — I10 HYPERTENSION, UNSPECIFIED TYPE: Primary | ICD-10-CM

## 2025-04-28 LAB
ALBUMIN SERPL BCP-MCNC: 4.3 G/DL (ref 3.4–5)
ALP SERPL-CCNC: 75 U/L (ref 33–136)
ALT SERPL W P-5'-P-CCNC: 26 U/L (ref 7–45)
ANION GAP SERPL CALC-SCNC: 10 MMOL/L (ref 10–20)
AST SERPL W P-5'-P-CCNC: 27 U/L (ref 9–39)
BASOPHILS # BLD AUTO: 0.04 X10*3/UL (ref 0–0.1)
BASOPHILS NFR BLD AUTO: 0.5 %
BILIRUB SERPL-MCNC: 0.3 MG/DL (ref 0–1.2)
BUN SERPL-MCNC: 15 MG/DL (ref 6–23)
CALCIUM SERPL-MCNC: 9.7 MG/DL (ref 8.6–10.3)
CARDIAC TROPONIN I PNL SERPL HS: 4 NG/L (ref 0–13)
CARDIAC TROPONIN I PNL SERPL HS: 4 NG/L (ref 0–13)
CHLORIDE SERPL-SCNC: 104 MMOL/L (ref 98–107)
CO2 SERPL-SCNC: 28 MMOL/L (ref 21–32)
CREAT SERPL-MCNC: 0.85 MG/DL (ref 0.5–1.05)
EGFRCR SERPLBLD CKD-EPI 2021: 76 ML/MIN/1.73M*2
EOSINOPHIL # BLD AUTO: 0.19 X10*3/UL (ref 0–0.7)
EOSINOPHIL NFR BLD AUTO: 2.3 %
ERYTHROCYTE [DISTWIDTH] IN BLOOD BY AUTOMATED COUNT: 15 % (ref 11.5–14.5)
GLUCOSE SERPL-MCNC: 88 MG/DL (ref 74–99)
HCT VFR BLD AUTO: 36.6 % (ref 36–46)
HGB BLD-MCNC: 12.2 G/DL (ref 12–16)
IMM GRANULOCYTES # BLD AUTO: 0.04 X10*3/UL (ref 0–0.7)
IMM GRANULOCYTES NFR BLD AUTO: 0.5 % (ref 0–0.9)
LYMPHOCYTES # BLD AUTO: 3.33 X10*3/UL (ref 1.2–4.8)
LYMPHOCYTES NFR BLD AUTO: 40.2 %
MCH RBC QN AUTO: 28.8 PG (ref 26–34)
MCHC RBC AUTO-ENTMCNC: 33.3 G/DL (ref 32–36)
MCV RBC AUTO: 87 FL (ref 80–100)
MONOCYTES # BLD AUTO: 0.57 X10*3/UL (ref 0.1–1)
MONOCYTES NFR BLD AUTO: 6.9 %
NEUTROPHILS # BLD AUTO: 4.11 X10*3/UL (ref 1.2–7.7)
NEUTROPHILS NFR BLD AUTO: 49.6 %
NRBC BLD-RTO: 0 /100 WBCS (ref 0–0)
PLATELET # BLD AUTO: 258 X10*3/UL (ref 150–450)
POTASSIUM SERPL-SCNC: 4.3 MMOL/L (ref 3.5–5.3)
PROT SERPL-MCNC: 7.8 G/DL (ref 6.4–8.2)
RBC # BLD AUTO: 4.23 X10*6/UL (ref 4–5.2)
SODIUM SERPL-SCNC: 138 MMOL/L (ref 136–145)
WBC # BLD AUTO: 8.3 X10*3/UL (ref 4.4–11.3)

## 2025-04-28 PROCEDURE — 84484 ASSAY OF TROPONIN QUANT: CPT | Performed by: INTERNAL MEDICINE

## 2025-04-28 PROCEDURE — 36415 COLL VENOUS BLD VENIPUNCTURE: CPT | Performed by: INTERNAL MEDICINE

## 2025-04-28 PROCEDURE — 80053 COMPREHEN METABOLIC PANEL: CPT | Performed by: INTERNAL MEDICINE

## 2025-04-28 PROCEDURE — 99284 EMERGENCY DEPT VISIT MOD MDM: CPT | Performed by: INTERNAL MEDICINE

## 2025-04-28 PROCEDURE — 93005 ELECTROCARDIOGRAM TRACING: CPT

## 2025-04-28 PROCEDURE — 85025 COMPLETE CBC W/AUTO DIFF WBC: CPT | Performed by: INTERNAL MEDICINE

## 2025-04-28 ASSESSMENT — COLUMBIA-SUICIDE SEVERITY RATING SCALE - C-SSRS
1. IN THE PAST MONTH, HAVE YOU WISHED YOU WERE DEAD OR WISHED YOU COULD GO TO SLEEP AND NOT WAKE UP?: NO
6. HAVE YOU EVER DONE ANYTHING, STARTED TO DO ANYTHING, OR PREPARED TO DO ANYTHING TO END YOUR LIFE?: NO
2. HAVE YOU ACTUALLY HAD ANY THOUGHTS OF KILLING YOURSELF?: NO

## 2025-04-28 ASSESSMENT — PAIN SCALES - GENERAL: PAINLEVEL_OUTOF10: 0 - NO PAIN

## 2025-04-28 ASSESSMENT — PAIN - FUNCTIONAL ASSESSMENT: PAIN_FUNCTIONAL_ASSESSMENT: 0-10

## 2025-04-28 NOTE — Clinical Note
Marina Menezes was seen and treated in our emergency department on 4/28/2025.  She may return to work on 04/29/2025.       If you have any questions or concerns, please don't hesitate to call.      Nona Miranda MD

## 2025-04-28 NOTE — ED PROVIDER NOTES
Emergency Department Provider Note        History of Present Illness     History provided by: Patient  Limitations to History: None  External Records Reviewed with Brief Summary:  Prior ED and outpatient progress notes reviewed for medical history    HPI:  Marina Menezes is a 65 y.o. female with PMHx T2DM, HTN, HLD who presented to emergency department for hypertension and lightheadedness.  Per patient, she felt lightheaded at her desk today at work, went to nurses office and checked her BP, 163/100, repeat 146/98.  Lightheadedness lasted for approximately 20 minutes.  Reports no chest pain, palpitations, back pain, nausea/vomiting, abdominal pain.  No headaches, vision changes, weakness/numbness, loss of balance.    Patient reports that she was until recently on chlorthalidone for BP management, her primary care provider discontinue this in favor of spironolactone approximately 2 weeks ago.     Physical Exam   Triage vitals:  T 36.8 °C (98.2 °F)  HR 71  BP (!) 138/94  RR 17  O2 99 % None (Room air)    Triage vitals reviewed.  Constitutional: Well developed adult in no acute distress, non toxic in appearance  Head: Normocephalic, atraumatic  Skin: Intact, dry. No rashes or lesions.  Eyes: Pupils are equal, reactive to light bilaterally. EOMI without nystagmus. No conjunctival injection.  Neck: Supple. Trachea is midline.  Pulmonary: Normal work of breathing with no accessory muscle use noted.  Clear to auscultation bilaterally.   Cardiovascular: Normal rate, regular rhythm.  No murmur/gallop/rubs appreciated. 2+ radial pulses bilaterally.  No pedal edema.  Abdomen: Soft, nondistended. Nontender to palpation.  Extremities: No gross deformities.  Moving all extremities spontaneously without difficulty.  Neuro: Awake and alert. Answers all questions appropriately. Speech is clear. Face is symmetric without facial droop and facial sensation to light touch equal bilaterally. Hearing intact bilaterally. Full and equal  shoulder shrug. 5/5 motor strength of UEs and LEs. Sensation to light touch intact in all four extremities. No gait abnormalities.     Medical Decision Making & ED Course   Medical Decision Makin y.o. female with PMHx T2DM, HTN, HLD who presents with lightheadedness and concern for hypertension.  On arrival she was afebrile, /94 on arrival, hemodynamically stable.  Labs collected from triage.  CBC, CMP unremarkable, initial troponin within normal limits.  Suspect symptomatic hypertension as patient's symptoms improved with spontaneous resolution of her elevated BP.  I have very low concern for hypertensive emergency as there is no evidence of endorgan ischemia on laboratory workup.  Patient has no history of CHF, no signs of volume overload on exam to suggest new onset CHF.    Discussed likely diagnosis with the patient who expressed understanding.  I recommended that she follow-up with her primary care provider tomorrow morning as planned.  Advised that they may recommend medication changes based on her symptoms today.  Reviewed return precautions including chest pain, shortness of breath, palpitations, or loss of consciousness.  Patient discharged in stable condition.    ----      Differential diagnoses considered include but are not limited to: Hypertension, orthostatic hypotension, vagal response,      Social Determinants of Health which Significantly Impact Care: None identified     EKG Independent Interpretation: EKG interpreted by myself. Please see ED Course for full interpretation.    Independent Result Review and Interpretation: Relevant laboratory and radiographic results were reviewed and independently interpreted by myself.  As necessary, they are commented on in the ED Course.    Chronic conditions affecting the patient's care: As documented above in [facility]    The patient was discussed with the following consultants/services: None    Care Considerations: As documented above in [facility]    ED  Course:  ED Course as of 04/28/25 1847   Mon Apr 28, 2025   1659 CBC with Differential(!)  CBC without leukocytosis or anemia [HH]   1659 Troponin I, High Sensitivity: 4  Initial troponin within normal limits [HH]   1701 ECG 12 lead  EKG obtained 1622 and interpreted by me: 67 bpm, normal sinus rhythm with a normal axis.  Normal ID and QRS intervals, no QTc prolongation.  No ST elevations, depressions, or T wave inversions concerning for ischemia.  Compared with prior EKG on/7/2025, first-degree AV block no longer evident, no PVCs noted [HH]   1845 Troponin I, High Sensitivity: 4  Delta troponin stable [HH]      ED Course User Index  [HH] Nona Miranda MD         Diagnoses as of 04/28/25 1847   Hypertension, unspecified type     Disposition   As a result of the work-up, the patient was discharged home.  she was informed of her diagnosis and instructed to come back with any concerns or worsening of condition.  she and was agreeable to the plan as discussed above.  she was given the opportunity to ask questions.  All of the patient's questions were answered.      Patient seen and discussed with ED attending physician.    Nona Miranda MD  Emergency Medicine     Nona Miranda MD  Resident  04/28/25 1364

## 2025-04-28 NOTE — ED TRIAGE NOTES
Pt arrived to ED for dizziness and hypertension. Pt denies chest pain but is SOB. Pt BP in triage 138/94. Pt states her doc recently changed her BP meds 2 weeks ago. EKG done in triage.

## 2025-04-28 NOTE — TELEPHONE ENCOUNTER
Patient stated she checked her Bp 1p 163/100 and she checked it agin 1:30p 146/98. Stated Bp started elevating today when she noticed it thinks it could be from the change in her medication has been feeling lightheaded.       Please Advise, Thank you

## 2025-04-29 ENCOUNTER — APPOINTMENT (OUTPATIENT)
Dept: PRIMARY CARE | Facility: CLINIC | Age: 65
End: 2025-04-29
Payer: COMMERCIAL

## 2025-04-29 VITALS
OXYGEN SATURATION: 90 % | RESPIRATION RATE: 14 BRPM | HEIGHT: 66 IN | SYSTOLIC BLOOD PRESSURE: 120 MMHG | HEART RATE: 87 BPM | TEMPERATURE: 97 F | DIASTOLIC BLOOD PRESSURE: 70 MMHG | BODY MASS INDEX: 38.09 KG/M2 | WEIGHT: 237 LBS

## 2025-04-29 DIAGNOSIS — I10 ESSENTIAL (PRIMARY) HYPERTENSION: ICD-10-CM

## 2025-04-29 LAB
ATRIAL RATE: 67 BPM
P AXIS: 58 DEGREES
P OFFSET: 167 MS
P ONSET: 121 MS
PR INTERVAL: 198 MS
Q ONSET: 220 MS
QRS COUNT: 11 BEATS
QRS DURATION: 66 MS
QT INTERVAL: 394 MS
QTC CALCULATION(BAZETT): 416 MS
QTC FREDERICIA: 409 MS
R AXIS: 22 DEGREES
T AXIS: 37 DEGREES
T OFFSET: 417 MS
VENTRICULAR RATE: 67 BPM

## 2025-04-29 NOTE — LETTER
April 29, 2025     Marina Menezes  [id number]  [facility] [id number]    Patient: Marina Menezes   YOB: 1960   Date of Visit: 4/29/2025       Dear Dr. Marina Menezes:    Thank you for referring Marina Menezes to me for evaluation. Below are my notes for this consultation.  If you have questions, please do not hesitate to call me. I look forward to following your patient along with you.       Sincerely,       Sia Menezes, DO   ______________________________________________________________________________________

## 2025-04-29 NOTE — LETTER
April 29, 2025     No Recipients    Patient: Marina Menezes   YOB: 1960   Date of Visit: 4/29/2025       Dear Dr. Mills Recipients:    Thank you for referring Marina Menezes to me for evaluation. Below are my notes for this consultation.  If you have questions, please do not hesitate to call me. I look forward to following your patient along with you.       Sincerely,     MILLIE IBRAHIM [id number] PC1 RN 1      CC: No Recipients  ______________________________________________________________________________________

## 2025-04-29 NOTE — PROGRESS NOTES
Patient here today for Blood pressure check. Her reading was 120/70. Patient was in ER yesterday due to dizziness and high BP. ER wanted her to follow up with pcp to make changes to her Spironolactone. Re-checked her BP after 15mins it was 120/70.  came in to see patient and answered all her questions since patient is going on a cruise to [place] tomorrow. No changes were made to her medication since her blood pressure was normal and patient states she is doing much better today.

## 2025-05-18 ENCOUNTER — APPOINTMENT (OUTPATIENT)
Dept: RADIOLOGY | Facility: HOSPITAL | Age: 65
End: 2025-05-18
Payer: COMMERCIAL

## 2025-05-18 ENCOUNTER — APPOINTMENT (OUTPATIENT)
Dept: CARDIOLOGY | Facility: HOSPITAL | Age: 65
End: 2025-05-18
Payer: COMMERCIAL

## 2025-05-18 ENCOUNTER — HOSPITAL ENCOUNTER (EMERGENCY)
Facility: HOSPITAL | Age: 65
Discharge: HOME | End: 2025-05-19
Attending: EMERGENCY MEDICINE
Payer: COMMERCIAL

## 2025-05-18 DIAGNOSIS — R07.9 CHEST PAIN, UNSPECIFIED TYPE: Primary | ICD-10-CM

## 2025-05-18 LAB
ALBUMIN SERPL BCP-MCNC: 4.3 G/DL (ref 3.4–5)
ALP SERPL-CCNC: 83 U/L (ref 33–136)
ALT SERPL W P-5'-P-CCNC: 25 U/L (ref 7–45)
ANION GAP SERPL CALC-SCNC: 14 MMOL/L (ref 10–20)
AST SERPL W P-5'-P-CCNC: 22 U/L (ref 9–39)
BASOPHILS # BLD AUTO: 0.04 X10*3/UL (ref 0–0.1)
BASOPHILS NFR BLD AUTO: 0.5 %
BILIRUB SERPL-MCNC: 0.2 MG/DL (ref 0–1.2)
BUN SERPL-MCNC: 17 MG/DL (ref 6–23)
CALCIUM SERPL-MCNC: 9.7 MG/DL (ref 8.6–10.3)
CARDIAC TROPONIN I PNL SERPL HS: 3 NG/L (ref 0–13)
CHLORIDE SERPL-SCNC: 106 MMOL/L (ref 98–107)
CO2 SERPL-SCNC: 23 MMOL/L (ref 21–32)
CREAT SERPL-MCNC: 0.87 MG/DL (ref 0.5–1.05)
EGFRCR SERPLBLD CKD-EPI 2021: 74 ML/MIN/1.73M*2
EOSINOPHIL # BLD AUTO: 0.18 X10*3/UL (ref 0–0.7)
EOSINOPHIL NFR BLD AUTO: 2.2 %
ERYTHROCYTE [DISTWIDTH] IN BLOOD BY AUTOMATED COUNT: 15.5 % (ref 11.5–14.5)
GLUCOSE SERPL-MCNC: 205 MG/DL (ref 74–99)
HCT VFR BLD AUTO: 38.4 % (ref 36–46)
HGB BLD-MCNC: 12.4 G/DL (ref 12–16)
IMM GRANULOCYTES # BLD AUTO: 0.04 X10*3/UL (ref 0–0.7)
IMM GRANULOCYTES NFR BLD AUTO: 0.5 % (ref 0–0.9)
INR PPP: 1 (ref 0.9–1.1)
LYMPHOCYTES # BLD AUTO: 3.38 X10*3/UL (ref 1.2–4.8)
LYMPHOCYTES NFR BLD AUTO: 41.2 %
MCH RBC QN AUTO: 28.2 PG (ref 26–34)
MCHC RBC AUTO-ENTMCNC: 32.3 G/DL (ref 32–36)
MCV RBC AUTO: 87 FL (ref 80–100)
MONOCYTES # BLD AUTO: 0.5 X10*3/UL (ref 0.1–1)
MONOCYTES NFR BLD AUTO: 6.1 %
NEUTROPHILS # BLD AUTO: 4.07 X10*3/UL (ref 1.2–7.7)
NEUTROPHILS NFR BLD AUTO: 49.5 %
NRBC BLD-RTO: 0 /100 WBCS (ref 0–0)
PLATELET # BLD AUTO: 274 X10*3/UL (ref 150–450)
POTASSIUM SERPL-SCNC: 4.2 MMOL/L (ref 3.5–5.3)
PROT SERPL-MCNC: 7.1 G/DL (ref 6.4–8.2)
PROTHROMBIN TIME: 10.5 SECONDS (ref 9.8–12.4)
RBC # BLD AUTO: 4.4 X10*6/UL (ref 4–5.2)
SODIUM SERPL-SCNC: 139 MMOL/L (ref 136–145)
WBC # BLD AUTO: 8.2 X10*3/UL (ref 4.4–11.3)

## 2025-05-18 PROCEDURE — 84484 ASSAY OF TROPONIN QUANT: CPT | Performed by: EMERGENCY MEDICINE

## 2025-05-18 PROCEDURE — 93005 ELECTROCARDIOGRAM TRACING: CPT

## 2025-05-18 PROCEDURE — 36415 COLL VENOUS BLD VENIPUNCTURE: CPT | Performed by: EMERGENCY MEDICINE

## 2025-05-18 PROCEDURE — 85610 PROTHROMBIN TIME: CPT | Performed by: EMERGENCY MEDICINE

## 2025-05-18 PROCEDURE — 80053 COMPREHEN METABOLIC PANEL: CPT | Performed by: EMERGENCY MEDICINE

## 2025-05-18 PROCEDURE — 85379 FIBRIN DEGRADATION QUANT: CPT | Performed by: EMERGENCY MEDICINE

## 2025-05-18 PROCEDURE — 85025 COMPLETE CBC W/AUTO DIFF WBC: CPT | Performed by: EMERGENCY MEDICINE

## 2025-05-18 PROCEDURE — 71045 X-RAY EXAM CHEST 1 VIEW: CPT | Performed by: RADIOLOGY

## 2025-05-18 PROCEDURE — 99285 EMERGENCY DEPT VISIT HI MDM: CPT | Performed by: EMERGENCY MEDICINE

## 2025-05-18 PROCEDURE — 71045 X-RAY EXAM CHEST 1 VIEW: CPT

## 2025-05-18 RX ORDER — LIDOCAINE HYDROCHLORIDE 20 MG/ML
15 SOLUTION OROPHARYNGEAL ONCE
Status: COMPLETED | OUTPATIENT
Start: 2025-05-18 | End: 2025-05-19

## 2025-05-18 RX ORDER — ACETAMINOPHEN 325 MG/1
975 TABLET ORAL ONCE
Status: COMPLETED | OUTPATIENT
Start: 2025-05-18 | End: 2025-05-19

## 2025-05-18 RX ORDER — ALUMINUM HYDROXIDE, MAGNESIUM HYDROXIDE, AND SIMETHICONE 1200; 120; 1200 MG/30ML; MG/30ML; MG/30ML
30 SUSPENSION ORAL ONCE
Status: COMPLETED | OUTPATIENT
Start: 2025-05-18 | End: 2025-05-19

## 2025-05-18 ASSESSMENT — PAIN DESCRIPTION - LOCATION: LOCATION: CHEST

## 2025-05-18 ASSESSMENT — PAIN - FUNCTIONAL ASSESSMENT: PAIN_FUNCTIONAL_ASSESSMENT: 0-10

## 2025-05-18 ASSESSMENT — PAIN SCALES - GENERAL: PAINLEVEL_OUTOF10: 9

## 2025-05-18 ASSESSMENT — PAIN DESCRIPTION - DESCRIPTORS: DESCRIPTORS: DISCOMFORT;PRESSURE

## 2025-05-18 NOTE — Clinical Note
Marina Menezes was seen and treated in our emergency department on 5/18/2025.  She may return to work on 05/20/2025.       If you have any questions or concerns, please don't hesitate to call.      Haile Ny MD

## 2025-05-19 VITALS
HEART RATE: 68 BPM | OXYGEN SATURATION: 99 % | WEIGHT: 217 LBS | DIASTOLIC BLOOD PRESSURE: 78 MMHG | HEIGHT: 66 IN | RESPIRATION RATE: 23 BRPM | BODY MASS INDEX: 34.87 KG/M2 | SYSTOLIC BLOOD PRESSURE: 127 MMHG | TEMPERATURE: 97.1 F

## 2025-05-19 LAB
ATRIAL RATE: 66 BPM
CARDIAC TROPONIN I PNL SERPL HS: 4 NG/L (ref 0–13)
D DIMER PPP FEU-MCNC: 507 NG/ML FEU
P AXIS: 49 DEGREES
P OFFSET: 171 MS
P ONSET: 131 MS
PR INTERVAL: 180 MS
Q ONSET: 221 MS
QRS COUNT: 11 BEATS
QRS DURATION: 66 MS
QT INTERVAL: 388 MS
QTC CALCULATION(BAZETT): 406 MS
QTC FREDERICIA: 400 MS
R AXIS: 31 DEGREES
T AXIS: 48 DEGREES
T OFFSET: 415 MS
VENTRICULAR RATE: 66 BPM

## 2025-05-19 PROCEDURE — 2500000001 HC RX 250 WO HCPCS SELF ADMINISTERED DRUGS (ALT 637 FOR MEDICARE OP): Performed by: EMERGENCY MEDICINE

## 2025-05-19 PROCEDURE — 2500000005 HC RX 250 GENERAL PHARMACY W/O HCPCS: Performed by: EMERGENCY MEDICINE

## 2025-05-19 RX ORDER — CYCLOBENZAPRINE HCL 10 MG
10 TABLET ORAL 3 TIMES DAILY PRN
Qty: 21 TABLET | Refills: 0 | Status: SHIPPED | OUTPATIENT
Start: 2025-05-19 | End: 2025-05-26

## 2025-05-19 RX ADMIN — ALUMINUM HYDROXIDE, MAGNESIUM HYDROXIDE, AND DIMETHICONE 30 ML: 200; 20; 200 SUSPENSION ORAL at 01:20

## 2025-05-19 RX ADMIN — LIDOCAINE HYDROCHLORIDE 15 ML: 20 SOLUTION ORAL at 01:20

## 2025-05-19 RX ADMIN — ACETAMINOPHEN 975 MG: 325 TABLET, FILM COATED ORAL at 01:19

## 2025-05-19 ASSESSMENT — PAIN DESCRIPTION - LOCATION: LOCATION: CHEST

## 2025-05-19 ASSESSMENT — PAIN SCALES - GENERAL
PAINLEVEL_OUTOF10: 6
PAINLEVEL_OUTOF10: 3

## 2025-05-19 ASSESSMENT — PAIN DESCRIPTION - PAIN TYPE: TYPE: ACUTE PAIN

## 2025-05-19 ASSESSMENT — PAIN DESCRIPTION - DESCRIPTORS: DESCRIPTORS: PRESSURE

## 2025-05-19 ASSESSMENT — PAIN - FUNCTIONAL ASSESSMENT: PAIN_FUNCTIONAL_ASSESSMENT: 0-10

## 2025-05-19 NOTE — ED PROVIDER NOTES
HPI   Chief Complaint   Patient presents with    Chest Pain       The patient has a history of high blood pressure diabetes.  No prior personal story of cardiac history but some family history with her father  of MI.  The patient presents with chest pain that [place].  She denies any smoking, drinking, street drugs or marijuana at this time.  She denies any fall or injury.  She does note that she just recently got back from a cruise from [place] 2 weeks ago.  She has no swelling in her legs.  She does note that she had a mild cough after this and has been improving.  She states that she had some nausea on the cruise well but no vomiting or stomach upset at this time.  Denies any fever, dysuria, hematuria, diarrhea, constipation or other new symptoms.              Patient History   Medical History[1]  Surgical History[2]  Family History[3]  Social History[4]    Physical Exam   ED Triage Vitals   Temperature Heart Rate Respirations BP   25   36.2 °C (97.1 °F) 70 18 137/85      Pulse Ox Temp Source Heart Rate Source Patient Position   25 -- --   99 % Temporal        BP Location FiO2 (%)     -- --             Physical Exam  Vitals and nursing note reviewed.   Constitutional:       General: She is not in acute distress.     Appearance: She is well-developed.   HENT:      Head: Normocephalic and atraumatic.      Nose: Nose normal.      Mouth/Throat:      Mouth: Mucous membranes are moist.   Eyes:      Conjunctiva/sclera: Conjunctivae normal.   Cardiovascular:      Rate and Rhythm: Regular rhythm. Bradycardia present.      Heart sounds: No murmur heard.  Pulmonary:      Effort: Pulmonary effort is normal. No respiratory distress.   Abdominal:      General: There is no distension.      Palpations: Abdomen is soft.      Tenderness: There is no right CVA tenderness or left CVA tenderness.   Musculoskeletal:          This would need to be sent to a compound pharmacy,    General: No swelling.      Cervical back: Neck supple.   Skin:     General: Skin is warm and dry.   Neurological:      General: No focal deficit present.      Mental Status: She is alert and oriented to person, place, and time.   Psychiatric:         Mood and Affect: Mood normal.           ED Course & MDM   Diagnoses as of 05/19/25 0542   Chest pain, unspecified type                 No data recorded                                 Medical Decision Making  EKG interpreted by myself.  Normal sinus rhythm at a rate of 66 bpm.  Normal intervals.  Normal axis.  No signs of acute ischemia.      This patient presents with chest pain today.  Considered musculoskeletal as she states movement makes it worse.  She denies any vomiting or changes with eating today.  The patient's troponin is unremarkable.  The patient has a D-dimer less than the use criteria.  No other signs or symptoms of DVT or dissection at this time.  Feel the patient can be discharged home with follow-up with her PCP.  Return precautions were given.    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    Skipped heart beats    COVID-19 04/28/2020    COVID-19 virus infection    Cramp and spasm 06/21/2019    Limb cramp    Cramp and spasm 07/27/2016    Limb cramps    Disorder of pigmentation, unspecified 07/27/2016    Discoloration of skin of toe    Dorsalgia, unspecified 09/20/2017    Musculoskeletal back pain    Effusion, left knee 10/24/2018    Effusion of left knee    Encounter for other general examination 10/22/2019    Encounter for biometric screening    Encounter for other screening for malignant neoplasm of breast 04/28/2021    Screening for breast cancer    Essential (primary) hypertension 11/14/2022    Hypertension    Immunization not carried out because of patient refusal 10/28/2018    Influenza vaccination declined by patient    Morbid (severe) obesity due to excess calories  (Multi) 01/07/2022    Class 2 severe obesity with serious comorbidity and body mass index (BMI) of 37.0 to 37.9 in adult    Morbid (severe) obesity due to excess calories (Multi) 12/07/2020    Class 2 severe obesity with serious comorbidity and body mass index (BMI) of 37.0 to 37.9 in adult    Myalgia, other site 04/28/2020    Musculoskeletal pain    Obstructive sleep apnea (adult) (pediatric)     Obstructive sleep apnea, adult    Other chest pain 08/25/2022    Atypical chest pain    Other fecal abnormalities 05/21/2019    Loose stools    Personal history of diseases of the blood and blood-forming organs and certain disorders involving the immune mechanism     History of anemia    Personal history of other diseases of the circulatory system     History of cardiac arrhythmia    Personal history of other diseases of the circulatory system 10/25/2019    History of hypertension    Personal history of other diseases of the musculoskeletal system and connective tissue     History of gout    Personal history of other diseases of the respiratory system 12/20/2019    History of acute bronchitis    Personal history of other endocrine, nutritional and metabolic disease     History of hyperlipidemia    Personal history of other endocrine, nutritional and metabolic disease     History of obesity    Personal history of other endocrine, nutritional and metabolic disease 06/20/2016    History of hypokalemia    Personal history of other endocrine, nutritional and metabolic disease 04/03/2018    History of hypokalemia    Personal history of other specified conditions     History of vertigo    Personal history of other specified conditions 06/08/2020    History of dizziness    Personal history of other specified conditions 03/05/2019    History of diarrhea    Personal history of other specified conditions 06/30/2020    History of abdominal pain    Personal history of other specified conditions 07/29/2019    History of palpitations     Personal history of other specified conditions 06/20/2016    History of nausea    Personal history of urinary (tract) infections 09/20/2017    History of urinary tract infection    Pleurodynia 12/20/2019    Rib pain on left side    Type 2 diabetes mellitus without complications 10/25/2019    Diabetes mellitus    Unspecified symptoms and signs involving the genitourinary system     UTI symptoms   [2]   Past Surgical History:  Procedure Laterality Date    CT ANGIO CORONARY ART WITH HEARTFLOW IF SCORE >30%  3/16/2022    CT HEART CORONARY ANGIOGRAM 3/16/2022 [facility] [place] [facility]    HYSTERECTOMY  08/23/2021    Hysterectomy    OTHER SURGICAL HISTORY  10/25/2019    Knee replacement    OTHER SURGICAL HISTORY  10/25/2019    Foot surgery   [3]   Family History  Problem Relation Name Age of Onset    Breast cancer Sister     [4]   Social History  Tobacco Use    Smoking status: Never    Smokeless tobacco: Never   Substance Use Topics    Alcohol use: Never    Drug use: Never        Haile Ny MD  05/19/25 0572

## 2025-05-19 NOTE — ED TRIAGE NOTES
Pt ambulates to triage with complaints of Chest Pain that started 15 mins ago. Denies Sob, nausea and vomiting. Pt states that it hurts worse with breathing. Family history of cardiac issues.

## 2025-06-11 DIAGNOSIS — K29.70 GASTRITIS WITHOUT BLEEDING, UNSPECIFIED CHRONICITY, UNSPECIFIED GASTRITIS TYPE: ICD-10-CM

## 2025-06-17 RX ORDER — OMEPRAZOLE 40 MG/1
40 CAPSULE, DELAYED RELEASE ORAL
Qty: 90 CAPSULE | Refills: 0 | Status: SHIPPED | OUTPATIENT
Start: 2025-06-17 | End: 2025-09-15

## 2025-07-07 ENCOUNTER — APPOINTMENT (OUTPATIENT)
Dept: OBSTETRICS AND GYNECOLOGY | Facility: CLINIC | Age: 65
End: 2025-07-07
Payer: COMMERCIAL

## 2025-07-07 ENCOUNTER — APPOINTMENT (OUTPATIENT)
Facility: CLINIC | Age: 65
End: 2025-07-07
Payer: COMMERCIAL

## 2025-07-07 VITALS
BODY MASS INDEX: 39.05 KG/M2 | SYSTOLIC BLOOD PRESSURE: 144 MMHG | HEIGHT: 66 IN | DIASTOLIC BLOOD PRESSURE: 83 MMHG | WEIGHT: 243 LBS

## 2025-07-07 DIAGNOSIS — Z12.4 CERVICAL CANCER SCREENING: ICD-10-CM

## 2025-07-07 DIAGNOSIS — Z01.419 ENCOUNTER FOR ANNUAL ROUTINE GYNECOLOGICAL EXAMINATION: Primary | ICD-10-CM

## 2025-07-07 PROCEDURE — G0101 CA SCREEN;PELVIC/BREAST EXAM: HCPCS | Performed by: OBSTETRICS & GYNECOLOGY

## 2025-07-07 PROCEDURE — 3079F DIAST BP 80-89 MM HG: CPT | Performed by: OBSTETRICS & GYNECOLOGY

## 2025-07-07 PROCEDURE — 3008F BODY MASS INDEX DOCD: CPT | Performed by: OBSTETRICS & GYNECOLOGY

## 2025-07-07 PROCEDURE — 1160F RVW MEDS BY RX/DR IN RCRD: CPT | Performed by: OBSTETRICS & GYNECOLOGY

## 2025-07-07 PROCEDURE — 3077F SYST BP >= 140 MM HG: CPT | Performed by: OBSTETRICS & GYNECOLOGY

## 2025-07-07 PROCEDURE — 1159F MED LIST DOCD IN RCRD: CPT | Performed by: OBSTETRICS & GYNECOLOGY

## 2025-07-07 PROCEDURE — 88175 CYTOPATH C/V AUTO FLUID REDO: CPT

## 2025-07-07 PROCEDURE — 87626 HPV SEP HI-RSK TYP&POOL RSLT: CPT

## 2025-07-07 PROCEDURE — 1036F TOBACCO NON-USER: CPT | Performed by: OBSTETRICS & GYNECOLOGY

## 2025-07-07 ASSESSMENT — ENCOUNTER SYMPTOMS
APPETITE CHANGE: 0
HEMATURIA: 0
FREQUENCY: 0
SLEEP DISTURBANCE: 0
FATIGUE: 0
FEVER: 0
COLOR CHANGE: 0
CHILLS: 0
CONSTIPATION: 0
VOMITING: 0
NAUSEA: 0
ABDOMINAL PAIN: 0
FLANK PAIN: 0
DIARRHEA: 0
DYSURIA: 0
BLOOD IN STOOL: 0
BACK PAIN: 0
SHORTNESS OF BREATH: 0
UNEXPECTED WEIGHT CHANGE: 0
ABDOMINAL DISTENTION: 0

## 2025-07-07 NOTE — PROGRESS NOTES
"History Of Present Illness  Routine Gyn Exam  Marina Menezes here for routine WWE.  Pt is postmenopausal.  Denies spotting or bleeding.     Concerns: vaginal discharge a few months ago, maybe some itching but now resolved.     Medical and surgical histories reviewed with patient. H/o supracervical hysterectomy.   Exercise: none. Active at work .      Gynecologic History  Postmenopausal.  Sexually active:  but no longer sexually active .  Last Pap: .   Last mammogram: .   Last Colonoscopy:  .   Last DEXA: .     Obstetric History  OB History    Para Term  AB Living   1 1 1      SAB IAB Ectopic Multiple Live Births             # Outcome Date GA Lbr Jf/2nd Weight Sex Type Anes PTL Lv   1 Term                 Review of Systems   Constitutional:  Negative for appetite change, chills, fatigue, fever and unexpected weight change.   Respiratory:  Negative for shortness of breath.    Cardiovascular:  Negative for chest pain.   Gastrointestinal:  Negative for abdominal distention, abdominal pain, blood in stool, constipation, diarrhea, nausea and vomiting.   Endocrine: Negative for cold intolerance and heat intolerance.   Genitourinary:  Negative for dyspareunia, dysuria, flank pain, frequency, genital sores, hematuria, menstrual problem, pelvic pain, urgency, vaginal bleeding, vaginal discharge and vaginal pain.   Musculoskeletal:  Negative for back pain.   Skin:  Negative for color change.   Psychiatric/Behavioral:  Negative for sleep disturbance.        /83   Ht 1.676 m (5' 6\")   Wt 110 kg (243 lb)   LMP  (LMP Unknown)   BMI 39.22 kg/m²      Physical Exam  Constitutional:       Appearance: Normal appearance.   HENT:      Head: Normocephalic and atraumatic.   Chest:   Breasts:     Right: Normal.      Left: Normal.   Abdominal:      General: Abdomen is flat.      Palpations: Abdomen is soft.      Tenderness: There is no abdominal tenderness.   Genitourinary:     General: Normal " vulva.      Vagina: Normal.      Cervix: Normal.      Uterus: Absent.       Adnexa: Right adnexa normal and left adnexa normal.      Comments: Pap collected today    Skin:     General: Skin is warm and dry.   Neurological:      Mental Status: She is alert and oriented to person, place, and time.   Psychiatric:         Mood and Affect: Mood normal.              Assessment/Plan         Routine Well Woman Exam Today  Discussed diet and exercise.   Reviewed routine health screenings.   Pap: pap done today   Recommend annual mammograms.   Currently up to date on colon cancer screening.                Dilma Holbrook MD

## 2025-07-10 DIAGNOSIS — I10 ESSENTIAL (PRIMARY) HYPERTENSION: ICD-10-CM

## 2025-07-11 RX ORDER — SPIRONOLACTONE 50 MG/1
50 TABLET, FILM COATED ORAL DAILY
Qty: 90 TABLET | Refills: 1 | Status: SHIPPED | OUTPATIENT
Start: 2025-07-11

## 2025-07-15 ENCOUNTER — APPOINTMENT (OUTPATIENT)
Dept: GASTROENTEROLOGY | Facility: CLINIC | Age: 65
End: 2025-07-15
Payer: COMMERCIAL

## 2025-07-15 VITALS — WEIGHT: 246 LBS | BODY MASS INDEX: 39.53 KG/M2 | HEIGHT: 66 IN | HEART RATE: 63 BPM

## 2025-07-15 DIAGNOSIS — R10.9 ABDOMINAL PAIN, UNSPECIFIED ABDOMINAL LOCATION: ICD-10-CM

## 2025-07-15 DIAGNOSIS — K21.9 GASTROESOPHAGEAL REFLUX DISEASE WITHOUT ESOPHAGITIS: Primary | ICD-10-CM

## 2025-07-15 PROCEDURE — 3008F BODY MASS INDEX DOCD: CPT | Performed by: INTERNAL MEDICINE

## 2025-07-15 PROCEDURE — 1036F TOBACCO NON-USER: CPT | Performed by: INTERNAL MEDICINE

## 2025-07-15 PROCEDURE — 99203 OFFICE O/P NEW LOW 30 MIN: CPT | Performed by: INTERNAL MEDICINE

## 2025-07-15 PROCEDURE — 1159F MED LIST DOCD IN RCRD: CPT | Performed by: INTERNAL MEDICINE

## 2025-07-15 ASSESSMENT — ENCOUNTER SYMPTOMS
RESPIRATORY NEGATIVE: 1
CARDIOVASCULAR NEGATIVE: 1
CONSTITUTIONAL NEGATIVE: 1
MYALGIAS: 1
ARTHRALGIAS: 1

## 2025-07-15 NOTE — PROGRESS NOTES
Subjective     History of Present Illness:     64yo with HTN, DL, DM2 , diverticulosis, MERVAT seen for abdominal pain. She presented to ER in March with burning upper abd pain x 4 days, sometimes exacerbated by PO intake. Trop neg x 2, CT a/p negative, labs unremarkable including lipase. She was treated for possible reflux with GI cocktail and pepcid.   Pt states she had immediate improvement in symptoms and was then placed on omeprazole 40 mg by primary without recurrent abdominal pain. Denies dysphagia /odynophagia, reflux, nausea or vomiting. Po intake good. Bms are 1-2 x per day formed without straining or incomplete evacuation.  No blood per rectum/melena.    Ct a/p without contrast 3/2025-mild diverticulosis    Colonoscopy 9/2023 (surveillance)- normal TI, extensive semi-liquid stool  lavaged with fairv visualization, 2 polyps one TA, diverticulosis, ext hemorrhoids    Colonoscopy 2017- 2 polyps, one TA     Soc hx: former tobacco use , rare etoh, no illicits    Fam hx: noncontributory, no GI illness    Review of Systems  Review of Systems   Constitutional: Negative.    Respiratory: Negative.     Cardiovascular: Negative.    Genitourinary: Negative.    Musculoskeletal:  Positive for arthralgias and myalgias.       Past Medical History  Medical History[1]    Social History  Social History[2]    Family History  Family History[3]     Allergies  RX Allergies[4]    Medications  Current Outpatient Medications   Medication Instructions    acetaminophen (Tylenol) 325 mg tablet Every 4 hours PRN    blood sugar diagnostic (OneTouch Ultra Test) Use once daily to check sugar    cholecalciferol (Vitamin D-3) 50 MCG (2000 UT) tablet 1 tablet, Daily    lancets (OneTouch UltraSoft Lancets) misc Use once daily to check sugar    metFORMIN XR (GLUCOPHAGE-XR) 1,000 mg, oral, Daily with breakfast, Do not crush, chew, or split.    multivitamin capsule Take by mouth.    omeprazole (PRILOSEC) 40 mg, oral, Daily before breakfast, Do not  crush or chew.    rosuvastatin (CRESTOR) 20 mg, oral, Daily    spironolactone (ALDACTONE) 50 mg, oral, Daily        Objective   There were no vitals taken for this visit.   Physical Exam  Vitals reviewed.   Constitutional:       General: She is awake.   Cardiovascular:      Rate and Rhythm: Normal rate and regular rhythm.   Pulmonary:      Effort: Pulmonary effort is normal.      Breath sounds: Normal breath sounds.   Abdominal:      General: Bowel sounds are normal.      Palpations: Abdomen is soft.      Tenderness: There is no abdominal tenderness.   Neurological:      Mental Status: She is alert and oriented to person, place, and time.   Psychiatric:         Attention and Perception: Attention and perception normal.         Behavior: Behavior normal.               Assessment/Plan     66yo with HTN, DL, DM2 , diverticulosis, MERVAT seen for abdominal pain secondary to reflux, now resolved on PPI.  No alarm symptoms, and no breakthrough. Colonoscopy up to date.   Continue omeprazole 40 mg started by primary  Instructed to contact office if any change in symptoms.   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/27/2019    Skipped heart beats    COVID-19 04/28/2020    COVID-19 virus infection    Cramp and spasm 06/21/2019    Limb cramp    Cramp and spasm 07/27/2016    Limb cramps    Disorder of pigmentation, unspecified 07/27/2016    Discoloration of skin of toe    Dorsalgia, unspecified 09/20/2017    Musculoskeletal back pain    Effusion, left knee 10/24/2018    Effusion of left knee    Encounter for other general examination 10/22/2019    Encounter for biometric screening    Encounter for other screening for malignant neoplasm of breast 04/28/2021    Screening for breast cancer    Essential (primary) hypertension 11/14/2022    Hypertension    Immunization not carried out because of patient refusal 10/28/2018    Influenza  vaccination declined by patient    Morbid (severe) obesity due to excess calories (Multi) 01/07/2022    Class 2 severe obesity with serious comorbidity and body mass index (BMI) of 37.0 to 37.9 in adult    Morbid (severe) obesity due to excess calories (Multi) 12/07/2020    Class 2 severe obesity with serious comorbidity and body mass index (BMI) of 37.0 to 37.9 in adult    Myalgia, other site 04/28/2020    Musculoskeletal pain    Obstructive sleep apnea (adult) (pediatric)     Obstructive sleep apnea, adult    Other chest pain 08/25/2022    Atypical chest pain    Other fecal abnormalities 05/21/2019    Loose stools    Personal history of diseases of the blood and blood-forming organs and certain disorders involving the immune mechanism     History of anemia    Personal history of other diseases of the circulatory system     History of cardiac arrhythmia    Personal history of other diseases of the circulatory system 10/25/2019    History of hypertension    Personal history of other diseases of the musculoskeletal system and connective tissue     History of gout    Personal history of other diseases of the respiratory system 12/20/2019    History of acute bronchitis    Personal history of other endocrine, nutritional and metabolic disease     History of hyperlipidemia    Personal history of other endocrine, nutritional and metabolic disease     History of obesity    Personal history of other endocrine, nutritional and metabolic disease 06/20/2016    History of hypokalemia    Personal history of other endocrine, nutritional and metabolic disease 04/03/2018    History of hypokalemia    Personal history of other specified conditions     History of vertigo    Personal history of other specified conditions 06/08/2020    History of dizziness    Personal history of other specified conditions 03/05/2019    History of diarrhea    Personal history of other specified conditions 06/30/2020    History of abdominal pain     Personal history of other specified conditions 07/29/2019    History of palpitations    Personal history of other specified conditions 06/20/2016    History of nausea    Personal history of urinary (tract) infections 09/20/2017    History of urinary tract infection    Pleurodynia 12/20/2019    Rib pain on left side    Type 2 diabetes mellitus without complications 10/25/2019    Diabetes mellitus    Unspecified symptoms and signs involving the genitourinary system     UTI symptoms   [2]   Social History  Tobacco Use    Smoking status: Never    Smokeless tobacco: Never   Substance Use Topics    Alcohol use: Never    Drug use: Never   [3]   Family History  Problem Relation Name Age of Onset    Breast cancer Sister     [4]   Allergies  Allergen Reactions    Sulfa (Sulfonamide Antibiotics) Hives, Itching, Rash and Unknown     PRURITIS    Sutures Other    Salicylates Diarrhea, Other and Unknown

## 2025-07-17 LAB
CYTOLOGY CMNT CVX/VAG CYTO-IMP: NORMAL
HPV HR 12 DNA GENITAL QL NAA+PROBE: NEGATIVE
HPV HR GENOTYPES PNL CVX NAA+PROBE: NEGATIVE
HPV16 DNA SPEC QL NAA+PROBE: NEGATIVE
HPV18 DNA SPEC QL NAA+PROBE: NEGATIVE
LAB AP HPV GENOTYPE QUESTION: YES
LAB AP HPV HR: NORMAL
LABORATORY COMMENT REPORT: NORMAL
PATH REPORT.TOTAL CANCER: NORMAL

## 2025-07-22 DIAGNOSIS — E11.69 TYPE 2 DIABETES MELLITUS WITH OBESITY (MULTI): Primary | ICD-10-CM

## 2025-07-22 DIAGNOSIS — E87.6 HYPOKALEMIA: ICD-10-CM

## 2025-07-22 DIAGNOSIS — E66.9 TYPE 2 DIABETES MELLITUS WITH OBESITY (MULTI): Primary | ICD-10-CM

## 2025-07-28 RX ORDER — DEXTROSE 4 G
TABLET,CHEWABLE ORAL
Qty: 1 EACH | Refills: 0 | Status: SHIPPED | OUTPATIENT
Start: 2025-07-28

## 2025-07-28 RX ORDER — LANCETS
EACH MISCELLANEOUS
Qty: 100 EACH | Refills: 3 | Status: SHIPPED | OUTPATIENT
Start: 2025-07-28

## 2025-07-28 RX ORDER — CALCIUM CITRATE/VITAMIN D3 200MG-6.25
TABLET ORAL
Qty: 100 STRIP | Refills: 3 | Status: SHIPPED | OUTPATIENT
Start: 2025-07-28

## 2025-07-28 RX ORDER — LANCETS 33 GAUGE
EACH MISCELLANEOUS
Refills: 3 | OUTPATIENT
Start: 2025-07-28

## 2025-08-04 ENCOUNTER — HOSPITAL ENCOUNTER (OUTPATIENT)
Dept: RADIOLOGY | Facility: CLINIC | Age: 65
Discharge: HOME | End: 2025-08-04
Payer: COMMERCIAL

## 2025-08-04 VITALS — HEIGHT: 66 IN | WEIGHT: 256 LBS | BODY MASS INDEX: 41.14 KG/M2

## 2025-08-04 DIAGNOSIS — Z12.31 SCREENING MAMMOGRAM FOR BREAST CANCER: ICD-10-CM

## 2025-08-04 PROCEDURE — 77067 SCR MAMMO BI INCL CAD: CPT

## 2025-08-04 PROCEDURE — 77063 BREAST TOMOSYNTHESIS BI: CPT | Performed by: RADIOLOGY

## 2025-08-04 PROCEDURE — 77067 SCR MAMMO BI INCL CAD: CPT | Performed by: RADIOLOGY

## 2025-08-26 ENCOUNTER — TELEPHONE (OUTPATIENT)
Dept: PRIMARY CARE | Facility: CLINIC | Age: 65
End: 2025-08-26
Payer: COMMERCIAL

## 2025-08-28 ENCOUNTER — PATIENT MESSAGE (OUTPATIENT)
Dept: PRIMARY CARE | Facility: CLINIC | Age: 65
End: 2025-08-28
Payer: COMMERCIAL

## 2025-09-25 ENCOUNTER — APPOINTMENT (OUTPATIENT)
Dept: PRIMARY CARE | Facility: CLINIC | Age: 65
End: 2025-09-25
Payer: COMMERCIAL

## 2025-09-26 ENCOUNTER — APPOINTMENT (OUTPATIENT)
Dept: PRIMARY CARE | Facility: CLINIC | Age: 65
End: 2025-09-26
Payer: COMMERCIAL

## 2026-03-23 ENCOUNTER — APPOINTMENT (OUTPATIENT)
Dept: PRIMARY CARE | Facility: CLINIC | Age: 66
End: 2026-03-23
Payer: COMMERCIAL